# Patient Record
Sex: MALE | Race: WHITE | NOT HISPANIC OR LATINO | Employment: OTHER | URBAN - NONMETROPOLITAN AREA
[De-identification: names, ages, dates, MRNs, and addresses within clinical notes are randomized per-mention and may not be internally consistent; named-entity substitution may affect disease eponyms.]

---

## 2018-01-20 ENCOUNTER — OFFICE VISIT (OUTPATIENT)
Dept: URGENT CARE | Facility: PHYSICIAN GROUP | Age: 73
End: 2018-01-20

## 2018-01-20 VITALS
HEIGHT: 70 IN | SYSTOLIC BLOOD PRESSURE: 138 MMHG | HEART RATE: 65 BPM | TEMPERATURE: 97.7 F | DIASTOLIC BLOOD PRESSURE: 80 MMHG | RESPIRATION RATE: 18 BRPM | OXYGEN SATURATION: 98 % | BODY MASS INDEX: 37.61 KG/M2 | WEIGHT: 262.7 LBS

## 2018-01-20 DIAGNOSIS — Z02.4 ENCOUNTER FOR DEPARTMENT OF TRANSPORTATION (DOT) EXAMINATION FOR DRIVING LICENSE RENEWAL: ICD-10-CM

## 2018-01-20 DIAGNOSIS — E66.9 OBESITY (BMI 30-39.9): ICD-10-CM

## 2018-01-20 PROCEDURE — 7100 PR DOT PHYSICAL: Performed by: PHYSICIAN ASSISTANT

## 2018-01-20 RX ORDER — METOPROLOL TARTRATE 100 MG/1
100 TABLET ORAL 2 TIMES DAILY
COMMUNITY
End: 2018-11-27

## 2018-01-20 NOTE — PROGRESS NOTES
72 y.o. male comes in for a DOT physical.. He has a history of hypertension, controlled on medications No history of asthma, heart disease, murmurs, seizure disorder or syncopal episodes with activity.  Please see the chart for further information, however normal physical exam, cleared for 1 yr without restrictions.

## 2018-06-27 ENCOUNTER — OFFICE VISIT (OUTPATIENT)
Dept: URGENT CARE | Facility: PHYSICIAN GROUP | Age: 73
End: 2018-06-27
Payer: MEDICARE

## 2018-06-27 VITALS
TEMPERATURE: 98 F | DIASTOLIC BLOOD PRESSURE: 98 MMHG | RESPIRATION RATE: 16 BRPM | SYSTOLIC BLOOD PRESSURE: 144 MMHG | BODY MASS INDEX: 39.08 KG/M2 | WEIGHT: 273 LBS | OXYGEN SATURATION: 98 % | HEIGHT: 70 IN | HEART RATE: 60 BPM

## 2018-06-27 DIAGNOSIS — E66.9 OBESITY (BMI 35.0-39.9 WITHOUT COMORBIDITY): ICD-10-CM

## 2018-06-27 DIAGNOSIS — H00.012 HORDEOLUM EXTERNUM OF RIGHT LOWER EYELID: ICD-10-CM

## 2018-06-27 DIAGNOSIS — L73.9 FOLLICULITIS: ICD-10-CM

## 2018-06-27 PROCEDURE — 99214 OFFICE O/P EST MOD 30 MIN: CPT | Performed by: PHYSICIAN ASSISTANT

## 2018-06-27 RX ORDER — CEPHALEXIN 500 MG/1
500 CAPSULE ORAL 3 TIMES DAILY
Qty: 30 CAP | Refills: 0 | Status: SHIPPED | OUTPATIENT
Start: 2018-06-27 | End: 2018-07-07

## 2018-06-27 RX ORDER — ERYTHROMYCIN 5 MG/G
1 OINTMENT OPHTHALMIC 2 TIMES DAILY
Qty: 1 TUBE | Refills: 0 | Status: SHIPPED | OUTPATIENT
Start: 2018-06-27 | End: 2019-01-17

## 2018-06-27 NOTE — PROGRESS NOTES
Chief Complaint   Patient presents with   • Rash     under R arm   • Eye Drainage     R eye       HISTORY OF PRESENT ILLNESS: Patient is a 72 y.o. male who presents today because he has 2 separate issues.    1.  He has swelling and pain of his right lower lid.  This started about 4 or 5 days ago after he was scratched in the lower lid by some sagebrush.  It seemed to get better but then over the last several days has gotten worse.  He did have some drainage this morning.  No visual disturbances or changes.  He has not been taking any medications for his symptoms.    2.  In his right axillary region he has multiple erythematous papules that have been there and progressively worsening but waxing and waning for the last month or so.    Patient Active Problem List    Diagnosis Date Noted   • Obesity (BMI 35.0-39.9 without comorbidity) ([facility]) 06/27/2018   • Obesity (BMI 30-39.9) 01/20/2018       Allergies:Patient has no known allergies.    Current Outpatient Prescriptions Ordered in [facility]   Medication Sig Dispense Refill   • cephALEXin (KEFLEX) 500 MG Cap Take 1 Cap by mouth 3 times a day for 10 days. 30 Cap 0   • erythromycin 5 MG/GM Ointment Place 1 cm in right eye 2 times a day. 1 Tube 0   • metoprolol (LOPRESSOR) 100 MG Tab Take 100 mg by mouth 2 times a day.     • AMLODIPINE BESYLATE PO Take  by mouth.     • fluticasone (FLONASE) 50 MCG/ACT nasal spray Spray 2 Sprays in nose every day. 1 Bottle 1   • predniSONE (DELTASONE) 10 MG Tab 2 tabs BID x 2 days, 3 tabs daily x 2 days, 2 tabs daily x 2 days, 1 tab daily x 2 days 20 Tab 0     No current Epic-ordered facility-administered medications on file.        Past Medical History:   Diagnosis Date   • CATARACT    • Dental disorder     full dentures   • Heart burn        Social History   Substance Use Topics   • Smoking status: Former Smoker   • Smokeless tobacco: Never Used      Comment: 1993   • Alcohol use No       No family status information on file.   No family  "history on file.    ROS:  Review of Systems   Constitutional: Negative for fever, chills, weight loss and malaise/fatigue.   HENT: Negative for ear pain, nosebleeds, congestion, sore throat and neck pain.    Eyes: Negative for blurred vision.  Positive for eye complaint as in HPI  Respiratory: Negative for cough, sputum production, shortness of breath and wheezing.    Cardiovascular: Negative for chest pain, palpitations, orthopnea and leg swelling.   Gastrointestinal: Negative for heartburn, nausea, vomiting and abdominal pain.   Genitourinary: Negative for dysuria, urgency and frequency.     Exam:  Blood pressure 144/98, pulse 60, temperature 36.7 °C (98 °F), resp. rate 16, height 1.778 m (5' 10\"), weight 123.8 kg (273 lb), SpO2 98 %.  General:  Well nourished, well developed male in NAD  Head:Normocephalic, atraumatic  Eyes: PERRLA, EOM within normal limits, no conjunctival injection, no scleral icterus, visual fields and acuity grossly intact.  Right lower lid has tender swelling along the lateral aspect.  Nose: Symmetrical without tenderness, no discharge.  Mouth: reasonable hygiene, no erythema exudates or tonsillar enlargement.  Neck: no masses, range of motion within normal limits, no tracheal deviation. No obvious thyroid enlargement.  Extremities: no clubbing, cyanosis, or edema.  Skin: In his right axillary region he has multiple erythematous pustules    Please note that this dictation was created using voice recognition software. I have made every reasonable attempt to correct obvious errors, but I expect that there are errors of grammar and possibly content that I did not discover before finalizing the note.    Assessment/Plan:  1. Hordeolum externum of right lower eyelid  erythromycin 5 MG/GM Ointment   2. Folliculitis  cephALEXin (KEFLEX) 500 MG Cap   3. Obesity (BMI 35.0-39.9 without comorbidity)  Patient identified as having weight management issue.  Appropriate orders and counseling given. "   Recommended warm packs to the eye    Followup with primary care in the next 7-10 days if not significantly improving, return to the urgent care or go to the emergency room sooner for any worsening of symptoms.

## 2018-07-07 ENCOUNTER — APPOINTMENT (OUTPATIENT)
Dept: RADIOLOGY | Facility: IMAGING CENTER | Age: 73
End: 2018-07-07
Attending: FAMILY MEDICINE
Payer: MEDICARE

## 2018-07-07 ENCOUNTER — APPOINTMENT (OUTPATIENT)
Dept: URGENT CARE | Facility: PHYSICIAN GROUP | Age: 73
End: 2018-07-07
Payer: MEDICARE

## 2018-07-07 DIAGNOSIS — R06.02 SHORTNESS OF BREATH: ICD-10-CM

## 2018-07-07 PROCEDURE — 71046 X-RAY EXAM CHEST 2 VIEWS: CPT | Mod: TC,FY | Performed by: FAMILY MEDICINE

## 2018-10-10 ENCOUNTER — OFFICE VISIT (OUTPATIENT)
Dept: URGENT CARE | Facility: PHYSICIAN GROUP | Age: 73
End: 2018-10-10
Payer: MEDICARE

## 2018-10-10 VITALS
HEIGHT: 70 IN | HEART RATE: 70 BPM | WEIGHT: 274 LBS | TEMPERATURE: 97.8 F | DIASTOLIC BLOOD PRESSURE: 98 MMHG | OXYGEN SATURATION: 94 % | RESPIRATION RATE: 16 BRPM | BODY MASS INDEX: 39.22 KG/M2 | SYSTOLIC BLOOD PRESSURE: 148 MMHG

## 2018-10-10 DIAGNOSIS — L91.8 INFLAMED SKIN TAG: ICD-10-CM

## 2018-10-10 PROCEDURE — 11200 RMVL SKIN TAGS UP TO&INC 15: CPT | Performed by: PHYSICIAN ASSISTANT

## 2018-10-10 NOTE — PROGRESS NOTES
Pleasant 73-year-old male is here because he has a skin tag in his right axillary region that has been catching on his shirt, is very tender and sore and has been giving him problems when he is trying to drive and shift his vehicle.  Discussed pros and cons of having the skin tag removed, discussed risks and benefits.  Patient agreed to have skin tag removal here in the office.  Area was thoroughly cleaned, anesthetized with 0.  5 cc 2% lidocaine without epinephrine.  Skin tag was removed in its entirety using a derma blade and cauterization for the capillary bleeding, wound care instructions given.  Patient tolerated well.  Less than 1 cc blood loss

## 2018-11-27 ENCOUNTER — OFFICE VISIT (OUTPATIENT)
Dept: MEDICAL GROUP | Facility: PHYSICIAN GROUP | Age: 73
End: 2018-11-27
Payer: MEDICARE

## 2018-11-27 VITALS
TEMPERATURE: 98.6 F | OXYGEN SATURATION: 94 % | WEIGHT: 276.4 LBS | HEIGHT: 69 IN | BODY MASS INDEX: 40.94 KG/M2 | RESPIRATION RATE: 16 BRPM | SYSTOLIC BLOOD PRESSURE: 176 MMHG | HEART RATE: 86 BPM | DIASTOLIC BLOOD PRESSURE: 90 MMHG

## 2018-11-27 DIAGNOSIS — Z23 NEED FOR VACCINATION: ICD-10-CM

## 2018-11-27 DIAGNOSIS — R79.89 LOW TESTOSTERONE IN MALE: ICD-10-CM

## 2018-11-27 DIAGNOSIS — G47.30 SLEEP-DISORDERED BREATHING: ICD-10-CM

## 2018-11-27 DIAGNOSIS — I10 ESSENTIAL HYPERTENSION: ICD-10-CM

## 2018-11-27 DIAGNOSIS — J44.9 COPD SUGGESTED BY INITIAL EVALUATION (HCC): ICD-10-CM

## 2018-11-27 PROBLEM — G47.33 OSA (OBSTRUCTIVE SLEEP APNEA): Status: ACTIVE | Noted: 2018-11-27

## 2018-11-27 PROBLEM — E66.9 OBESITY (BMI 30-39.9): Status: RESOLVED | Noted: 2018-01-20 | Resolved: 2018-11-27

## 2018-11-27 PROCEDURE — 90662 IIV NO PRSV INCREASED AG IM: CPT | Performed by: NURSE PRACTITIONER

## 2018-11-27 PROCEDURE — 99214 OFFICE O/P EST MOD 30 MIN: CPT | Mod: 25 | Performed by: NURSE PRACTITIONER

## 2018-11-27 PROCEDURE — G0008 ADMIN INFLUENZA VIRUS VAC: HCPCS | Performed by: NURSE PRACTITIONER

## 2018-11-27 RX ORDER — LISINOPRIL AND HYDROCHLOROTHIAZIDE 12.5; 1 MG/1; MG/1
1 TABLET ORAL DAILY
Qty: 90 TAB | Refills: 1 | Status: SHIPPED | OUTPATIENT
Start: 2018-11-27 | End: 2018-12-20

## 2018-11-27 RX ORDER — METOPROLOL SUCCINATE 100 MG/1
200 TABLET, EXTENDED RELEASE ORAL DAILY
Qty: 60 TAB | Refills: 5
Start: 2018-11-27 | End: 2019-05-31 | Stop reason: SDUPTHER

## 2018-11-27 RX ORDER — ALBUTEROL SULFATE 90 UG/1
2 AEROSOL, METERED RESPIRATORY (INHALATION) EVERY 4 HOURS PRN
Qty: 1 INHALER | Refills: 1 | Status: SHIPPED | OUTPATIENT
Start: 2018-11-27 | End: 2019-11-12 | Stop reason: SDUPTHER

## 2018-11-27 NOTE — PATIENT INSTRUCTIONS
To do:     Stop the singulair (motelukast), stop the amlodipine.     Continue the metoprolol. Call me when you need a refill. Give the coupons to pharmacist at time of purchase. Will need to get coupons online at [employer] (in the future).    Start lisinopril HCTZ every morning. In addition to the metoprolol.   Continue to check BP daily, same time of day.     Fasting labs when you return, must be before 9 am.     Use the albuterol 2 puffs as needed for shortness of breath, feeling winded. Can use every 4-6 hours if needed.     Follow up 1 month

## 2018-11-27 NOTE — ASSESSMENT & PLAN NOTE
Patient is a 73-year-old male who is new to me and here to establish care.  He is currently being treated for hypertension with amlodipine 5 mg daily, metoprolol  mg twice daily.  Blood pressure remains uncontrolled, his blood pressure in the office today is 176/90, having some lightheadedness, no chest pain, shortness of breath, change in vision, headaches.  He feels that his previous PCP is not adequately controlling his blood pressure so is here for a second opinion.  He has not tried an ACE inhibitor or diuretic, we did discuss these today and he is willing to try a new medication.  He does not have prescription coverage and is concerned about finances.  He also wonders if one of his medications is causing flushing.

## 2018-11-27 NOTE — ASSESSMENT & PLAN NOTE
Patient reports low testosterone for which he was on IM testosterone replacement with previous PCP.  Unfortunately, [facility] will no longer fill previous PCPs controlled medications.  He was his symptoms were previously well controlled on testosterone cypionate 100 mg Q 14 day replacement. He has been off for a few weeks, having fatigue, leg cramps. Will get baseline labs.

## 2018-11-27 NOTE — ASSESSMENT & PLAN NOTE
Patient is a 73-year-old male with COPD diagnosed by outside provider following chest x-ray for shortness of breath.  Chest x-ray does show COPD with hyperinflation.  He is no longer smoking, quit 20 years ago though at the time he was smoking 3 packs a day.  He has no night sweats, unintentional weight loss.  He occasionally gets winded when putting on his socks, he is morbidly obese.

## 2018-11-27 NOTE — PROGRESS NOTES
[facility]  Primary Care Office Visit - Problem-Oriented        History:     Estuardo Christian is a 73 y.o. male who is here today to discuss Other (b/p problems high) and Establish Care (new pt / light headed)      Essential hypertension  Patient is a 73-year-old male who is new to me and here to establish care.  He is currently being treated for hypertension with amlodipine 5 mg daily, metoprolol  mg twice daily.  Blood pressure remains uncontrolled, his blood pressure in the office today is 176/90, having some lightheadedness, no chest pain, shortness of breath, change in vision, headaches.  He feels that his previous PCP is not adequately controlling his blood pressure so is here for a second opinion.  He has not tried an ACE inhibitor or diuretic, we did discuss these today and he is willing to try a new medication.  He does not have prescription coverage and is concerned about finances.  He also wonders if one of his medications is causing flushing.     COPD suggested by initial evaluation ([facility])  Patient is a 73-year-old male with COPD diagnosed by outside provider following chest x-ray for shortness of breath.  Chest x-ray does show COPD with hyperinflation.  He is no longer smoking, quit 20 years ago though at the time he was smoking 3 packs a day.  He has no night sweats, unintentional weight loss.  He occasionally gets winded when putting on his socks, he is morbidly obese.     Sleep-disordered breathing  Patient was diagnosed with obstructive sleep apnea by previous PCP, has not had sleep study, CPAP was recommended though patient refuses.  We did discuss the long-term consequences of uncontrolled obstructive sleep apnea, specifically as it relates to hypertension and heart failure.     Low testosterone in male  Patient reports low testosterone for which he was on IM testosterone replacement with previous PCP.  Unfortunately, [facility] will no longer fill previous PCPs controlled medications.   He was his symptoms were previously well controlled on testosterone cypionate 100 mg Q 14 day replacement. He has been off for a few weeks, having fatigue, leg cramps. Will get baseline labs.         Past Medical History:   Diagnosis Date   • CATARACT    • Dental disorder     full dentures   • Heart burn      Past Surgical History:   Procedure Laterality Date   • NASAL FLAP  4/14/2011    Performed by BUTCH MCLEAN at SURGERY SAME DAY [facility] ORS   • LUMBAR LAMINECTOMY DISKECTOMY  3/25/2009    Performed by RANULFO ASH at SURGERY [facility] ORS   • SHOULDER SURGERY      right rcr     Social History     Social History   • Marital status:      Spouse name: N/A   • Number of children: N/A   • Years of education: N/A     Occupational History   • Not on file.     Social History Main Topics   • Smoking status: Former Smoker   • Smokeless tobacco: Never Used      Comment: 1993   • Alcohol use No   • Drug use: No   • Sexual activity: Not on file     Other Topics Concern   • Not on file     Social History Narrative   • No narrative on file     History   Smoking Status   • Former Smoker   Smokeless Tobacco   • Never Used     Comment: 1993     No family history on file.  No Known Allergies    Problem List:     Patient Active Problem List    Diagnosis Date Noted   • Essential hypertension 11/27/2018   • COPD suggested by initial evaluation ([facility]) 11/27/2018   • Sleep-disordered breathing 11/27/2018   • Low testosterone in male 11/27/2018   • Obesity (BMI 35.0-39.9 without comorbidity) ([facility]) 06/27/2018         Medications:     Current Outpatient Prescriptions:   •  metoprolol SR (TOPROL XL) 100 MG TABLET SR 24 HR, Take 2 Tabs by mouth every day., Disp: 60 Tab, Rfl: 5  •  lisinopril-hydrochlorothiazide (PRINZIDE, ZESTORETIC) 10-12.5 MG per tablet, Take 1 Tab by mouth every day., Disp: 90 Tab, Rfl: 1  •  albuterol 108 (90 Base) MCG/ACT Aero Soln inhalation aerosol, Inhale 2 Puffs by mouth every four hours as needed  "for Shortness of Breath., Disp: 1 Inhaler, Rfl: 1  •  erythromycin 5 MG/GM Ointment, Place 1 cm in right eye 2 times a day. (Patient not taking: Reported on 11/27/2018), Disp: 1 Tube, Rfl: 0  •  fluticasone (FLONASE) 50 MCG/ACT nasal spray, Spray 2 Sprays in nose every day. (Patient not taking: Reported on 11/27/2018), Disp: 1 Bottle, Rfl: 1  •  predniSONE (DELTASONE) 10 MG Tab, 2 tabs BID x 2 days, 3 tabs daily x 2 days, 2 tabs daily x 2 days, 1 tab daily x 2 days, Disp: 20 Tab, Rfl: 0      Review of Systems:     Pertinent positives as per HPI, all other systems reviewed and WNL     Physical Assessment:     VS: BP (!) 176/90 (BP Location: Left arm, Patient Position: Sitting, BP Cuff Size: Adult long)   Pulse 86   Temp 37 °C (98.6 °F) (Temporal)   Resp 16   Ht 1.753 m (5' 9\")   Wt (!) 125.4 kg (276 lb 6.4 oz)   SpO2 94%   BMI 40.82 kg/m²     General: Well-developed, well-nourished, male, obese     Head: PERRL, EOMI. Normocephalic. No facial asymmetry noted. Flushed face.  Cardiovasc:  RRR, no MRG. No thrills or bruits. Pulses 2+ and symmetric at all distal extremities.  Pulmonary: Lungs clear bilaterally.  Normal respiratory effort. No wheeze or crackles.   Neuro: Alert and oriented, CNs II-XII intact, no focal deficits.  Skin:No rashes noted. Skin warm, dry, intact    Psych: Dressed appropriately for the weather, pleasant and conversant.  Affect, mood & judgment appropriate.    Assessment/Plan:   Estuardo was seen today for other and establish care.    Diagnoses and all orders for this visit:    Essential hypertension, chronic, uncontrolled   - discontinue amlodipine - likely contributing to flushing, continue metoprolol ER, start lisinopril hydrochlorothiazide daily.  Aware of adverse effects of medication.  Monitor blood pressure at home.  Follow-up in 2-4 weeks for office visit, sooner if needed.  Fasting labs ordered.  -     lisinopril-hydrochlorothiazide (PRINZIDE, ZESTORETIC) 10-12.5 MG per tablet; Take 1 " Tab by mouth every day.  -     COMP METABOLIC PANEL; Future  -     metoprolol SR (TOPROL XL) 100 MG TABLET SR 24 HR; Take 2 Tabs by mouth every day.    Low testosterone in male, unclear control, check labs  -     TESTOSTERONE, FREE AND TOTAL; Future    COPD suggested by initial evaluation ([facility])  -Remote though significant smoking history and a chest x-ray with findings consistent with COPD and hyperinflation of the lungs.  Trial albuterol.  Follow-up in 1 month, sooner if needed.  Consideration for LABA/ICS.   -     albuterol 108 (90 Base) MCG/ACT Aero Soln inhalation aerosol; Inhale 2 Puffs by mouth every four hours as needed for Shortness of Breath.    Need for vaccination  -     INFLUENZA VACCINE, HIGH DOSE (65+ ONLY)    Sleep-disordered breathing  - will likely need formal sleep study, patient declines referral at this time.       Patient is agreeable to the above plan and voiced understanding. All questions answered.     Please note that this dictation was created using voice recognition software. I have made every reasonable attempt to correct obvious errors, but I expect that there are errors of grammar and possibly content that I did not discover before finalizing the note.      VALENTIN Santana  11/27/2018, 2:10 PM

## 2018-11-27 NOTE — ASSESSMENT & PLAN NOTE
Patient was diagnosed with obstructive sleep apnea by previous PCP, has not had sleep study, CPAP was recommended though patient refuses.  We did discuss the long-term consequences of uncontrolled obstructive sleep apnea, specifically as it relates to hypertension and heart failure.

## 2018-12-17 ENCOUNTER — HOSPITAL ENCOUNTER (OUTPATIENT)
Dept: LAB | Facility: MEDICAL CENTER | Age: 73
End: 2018-12-17
Attending: NURSE PRACTITIONER
Payer: MEDICARE

## 2018-12-17 DIAGNOSIS — I10 ESSENTIAL HYPERTENSION: ICD-10-CM

## 2018-12-17 DIAGNOSIS — R79.89 LOW TESTOSTERONE IN MALE: ICD-10-CM

## 2018-12-17 LAB
ALBUMIN SERPL BCP-MCNC: 4.5 G/DL (ref 3.2–4.9)
ALBUMIN/GLOB SERPL: 1.8 G/DL
ALP SERPL-CCNC: 74 U/L (ref 30–99)
ALT SERPL-CCNC: 59 U/L (ref 2–50)
ANION GAP SERPL CALC-SCNC: 8 MMOL/L (ref 0–11.9)
AST SERPL-CCNC: 37 U/L (ref 12–45)
BILIRUB SERPL-MCNC: 1.7 MG/DL (ref 0.1–1.5)
BUN SERPL-MCNC: 17 MG/DL (ref 8–22)
CALCIUM SERPL-MCNC: 9.8 MG/DL (ref 8.5–10.5)
CHLORIDE SERPL-SCNC: 102 MMOL/L (ref 96–112)
CO2 SERPL-SCNC: 29 MMOL/L (ref 20–33)
CREAT SERPL-MCNC: 1.02 MG/DL (ref 0.5–1.4)
GLOBULIN SER CALC-MCNC: 2.5 G/DL (ref 1.9–3.5)
GLUCOSE SERPL-MCNC: 151 MG/DL (ref 65–99)
POTASSIUM SERPL-SCNC: 4.3 MMOL/L (ref 3.6–5.5)
PROT SERPL-MCNC: 7 G/DL (ref 6–8.2)
SODIUM SERPL-SCNC: 139 MMOL/L (ref 135–145)

## 2018-12-17 PROCEDURE — 36415 COLL VENOUS BLD VENIPUNCTURE: CPT

## 2018-12-17 PROCEDURE — 80053 COMPREHEN METABOLIC PANEL: CPT

## 2018-12-17 PROCEDURE — 84270 ASSAY OF SEX HORMONE GLOBUL: CPT

## 2018-12-17 PROCEDURE — 84403 ASSAY OF TOTAL TESTOSTERONE: CPT

## 2018-12-19 LAB
SHBG SERPL-SCNC: 47 NMOL/L (ref 11–80)
TESTOST FREE MFR SERPL: 1.4 % (ref 1.6–2.9)
TESTOST FREE SERPL-MCNC: 38 PG/ML (ref 47–244)
TESTOST SERPL-MCNC: 265 NG/DL (ref 300–720)

## 2018-12-20 ENCOUNTER — NON-PROVIDER VISIT (OUTPATIENT)
Dept: MEDICAL GROUP | Facility: PHYSICIAN GROUP | Age: 73
End: 2018-12-20
Payer: MEDICARE

## 2018-12-20 ENCOUNTER — TELEPHONE (OUTPATIENT)
Dept: MEDICAL GROUP | Facility: PHYSICIAN GROUP | Age: 73
End: 2018-12-20

## 2018-12-20 ENCOUNTER — OFFICE VISIT (OUTPATIENT)
Dept: MEDICAL GROUP | Facility: PHYSICIAN GROUP | Age: 73
End: 2018-12-20
Payer: MEDICARE

## 2018-12-20 VITALS
OXYGEN SATURATION: 95 % | DIASTOLIC BLOOD PRESSURE: 88 MMHG | TEMPERATURE: 97.5 F | RESPIRATION RATE: 16 BRPM | SYSTOLIC BLOOD PRESSURE: 160 MMHG | BODY MASS INDEX: 40.58 KG/M2 | WEIGHT: 274 LBS | HEIGHT: 69 IN | HEART RATE: 64 BPM

## 2018-12-20 DIAGNOSIS — H92.01 RIGHT EAR PAIN: ICD-10-CM

## 2018-12-20 DIAGNOSIS — R79.89 LOW TESTOSTERONE IN MALE: ICD-10-CM

## 2018-12-20 DIAGNOSIS — I10 ESSENTIAL HYPERTENSION: ICD-10-CM

## 2018-12-20 DIAGNOSIS — R73.01 ELEVATED FASTING GLUCOSE: ICD-10-CM

## 2018-12-20 DIAGNOSIS — Z13.220 SCREENING FOR LIPID DISORDERS: ICD-10-CM

## 2018-12-20 PROCEDURE — 99214 OFFICE O/P EST MOD 30 MIN: CPT | Performed by: NURSE PRACTITIONER

## 2018-12-20 PROCEDURE — 96372 THER/PROPH/DIAG INJ SC/IM: CPT | Performed by: NURSE PRACTITIONER

## 2018-12-20 RX ORDER — TESTOSTERONE CYPIONATE 200 MG/ML
100 INJECTION, SOLUTION INTRAMUSCULAR ONCE
Qty: 0.5 ML | Refills: 0 | Status: SHIPPED | OUTPATIENT
Start: 2018-12-20 | End: 2018-12-20

## 2018-12-20 RX ORDER — TESTOSTERONE CYPIONATE 200 MG/ML
100 INJECTION, SOLUTION INTRAMUSCULAR ONCE
Status: COMPLETED | OUTPATIENT
Start: 2018-12-20 | End: 2018-12-20

## 2018-12-20 RX ORDER — LISINOPRIL AND HYDROCHLOROTHIAZIDE 20; 12.5 MG/1; MG/1
2 TABLET ORAL DAILY
Qty: 180 TAB | Refills: 1 | Status: SHIPPED | OUTPATIENT
Start: 2018-12-20 | End: 2019-05-31 | Stop reason: SDUPTHER

## 2018-12-20 RX ORDER — TESTOSTERONE CYPIONATE 200 MG/ML
100 INJECTION, SOLUTION INTRAMUSCULAR
Qty: 1 VIAL | Refills: 2 | Status: SHIPPED | OUTPATIENT
Start: 2018-12-20 | End: 2019-01-03

## 2018-12-20 RX ADMIN — TESTOSTERONE CYPIONATE 100 MG: 200 INJECTION, SOLUTION INTRAMUSCULAR at 16:22

## 2018-12-20 NOTE — NON-PROVIDER
Estuardo Christian is a 73 y.o. male here for a non-provider visit for Testosterone injection.    Reason for injection: Low testosterone  Order in MAR?: Yes  Patient supplied?:Yes  Minimum interval has been met for this injection (per MAR order): Yes    Order and dose verified by: Antoinette GÓMEZ  Patient tolerated injection and no adverse effects were observed or reported: Yes    # of Administrations remaining in MAR:

## 2018-12-20 NOTE — ASSESSMENT & PLAN NOTE
Patient is a 73-year-old male who is here for follow-up.  He continues on lisinopril hydrochlorothiazide 10-12 0.5 daily.  Unfortunately blood pressure is uncontrolled.  No longer on the amlodipine and the flushing has significantly improved.  He denies chest pain, shortness of breath, change in vision, headaches.  CMP is reviewed, normal renal function.

## 2018-12-20 NOTE — ASSESSMENT & PLAN NOTE
She was noted to have elevated fasting glucose on most recent labs @151.  He denies polyuria or polydipsia though he does describe polyphasia.  He is obese, strong family history of type 2 diabetes.  Does not want to change his diet and certainly does not want to go on insulin.

## 2018-12-20 NOTE — ASSESSMENT & PLAN NOTE
He describes right ear fullness for the last couple of days, no rosa ear pain or hearing loss, ringing in the ear.  Does have a cold, mild cough.

## 2018-12-20 NOTE — PROGRESS NOTES
[facility]  Primary Care Office Visit - Problem-Oriented        History:     Estuardo Christian is a 73 y.o. male who is here today to discuss Hypertension and COPD      Essential hypertension  Patient is a 73-year-old male who is here for follow-up.  He continues on lisinopril hydrochlorothiazide 10-12 0.5 daily.  Unfortunately blood pressure is uncontrolled.  No longer on the amlodipine and the flushing has significantly improved.  He denies chest pain, shortness of breath, change in vision, headaches.  CMP is reviewed, normal renal function.    Low testosterone in male  She was noted to have low free and total testosterone on most recent labs, describes debilitating fatigue.  Would like to resume testosterone cypionate injection.    Right ear pain  He describes right ear fullness for the last couple of days, no rosa ear pain or hearing loss, ringing in the ear.  Does have a cold, mild cough.    Elevated fasting glucose  She was noted to have elevated fasting glucose on most recent labs @151.  He denies polyuria or polydipsia though he does describe polyphasia.  He is obese, strong family history of type 2 diabetes.  Does not want to change his diet and certainly does not want to go on insulin.        Past Medical History:   Diagnosis Date   • CATARACT    • Dental disorder     full dentures   • Heart burn      Past Surgical History:   Procedure Laterality Date   • NASAL FLAP  4/14/2011    Performed by BUTCH MCLEAN at SURGERY SAME DAY [facility] ORS   • LUMBAR LAMINECTOMY DISKECTOMY  3/25/2009    Performed by RANULFO ASH at SURGERY [facility] ORS   • SHOULDER SURGERY      right rcr     Social History     Social History   • Marital status:      Spouse name: N/A   • Number of children: N/A   • Years of education: N/A     Occupational History   • Not on file.     Social History Main Topics   • Smoking status: Former Smoker   • Smokeless tobacco: Never Used      Comment: 1993   • Alcohol use No    • Drug use: No   • Sexual activity: Not on file     Other Topics Concern   • Not on file     Social History Narrative   • No narrative on file     History   Smoking Status   • Former Smoker   Smokeless Tobacco   • Never Used     Comment: 1993     History reviewed. No pertinent family history.  No Known Allergies    Problem List:     Patient Active Problem List    Diagnosis Date Noted   • Right ear pain 12/20/2018   • Elevated fasting glucose 12/20/2018   • Essential hypertension 11/27/2018   • COPD suggested by initial evaluation ([facility]) 11/27/2018   • Sleep-disordered breathing 11/27/2018   • Low testosterone in male 11/27/2018   • Obesity (BMI 35.0-39.9 without comorbidity) ([facility]) 06/27/2018         Medications:     Current Outpatient Prescriptions:   •  testosterone cypionate (DEPO-TESTOSTERONE) 200 MG/ML Solution injection, 0.5 mL by Intramuscular route every 14 days for 90 days., Disp: 1 Vial, Rfl: 2  •  lisinopril-hydrochlorothiazide (PRINZIDE, ZESTORETIC) 20-12.5 MG per tablet, Take 2 Tabs by mouth every day., Disp: 180 Tab, Rfl: 1  •  metoprolol SR (TOPROL XL) 100 MG TABLET SR 24 HR, Take 2 Tabs by mouth every day., Disp: 60 Tab, Rfl: 5  •  albuterol 108 (90 Base) MCG/ACT Aero Soln inhalation aerosol, Inhale 2 Puffs by mouth every four hours as needed for Shortness of Breath., Disp: 1 Inhaler, Rfl: 1  •  erythromycin 5 MG/GM Ointment, Place 1 cm in right eye 2 times a day. (Patient not taking: Reported on 11/27/2018), Disp: 1 Tube, Rfl: 0  •  fluticasone (FLONASE) 50 MCG/ACT nasal spray, Spray 2 Sprays in nose every day. (Patient not taking: Reported on 11/27/2018), Disp: 1 Bottle, Rfl: 1  •  predniSONE (DELTASONE) 10 MG Tab, 2 tabs BID x 2 days, 3 tabs daily x 2 days, 2 tabs daily x 2 days, 1 tab daily x 2 days, Disp: 20 Tab, Rfl: 0      Review of Systems:     Pertinent positives as per HPI, all other systems reviewed and WNL     Physical Assessment:     VS: BP (!) 170/90 (BP Location: Right arm, Patient  "Position: Sitting, BP Cuff Size: Large adult)   Pulse 64   Temp 36.4 °C (97.5 °F) (Temporal)   Resp 16   Ht 1.753 m (5' 9\")   Wt 124.3 kg (274 lb)   SpO2 95%   BMI 40.46 kg/m²     General: Well-developed, well-nourished, male, obese    Head: PERRL, EOMI. Normocephalic. No facial asymmetry noted.  Ears: scant fluid noted behind right TM.  Bilateral EACs wnl.  Cardiovasc: RRR, no MRG. No thrills or bruits. Pulses 2+ and symmetric at all distal extremities.  Pulmonary: Lungs clear bilaterally.  Normal respiratory effort. No wheeze or crackles.   Extremities: No edema  Neuro: Alert and oriented, CNs II-XII intact, no focal deficits.  Skin:No rashes noted. Skin warm, dry, intact     Psych: Dressed appropriately for the weather, pleasant and conversant.  Affect, mood & judgment appropriate.    Assessment/Plan:   Estuardo was seen today for hypertension and copd.    Diagnoses and all orders for this visit:    Essential hypertension, uncontrolled   -Maximize lisinopril, no changes to metoprolol, monitor blood pressure at home, follow-up in 7-10 days for blood pressure check in the office.  Follow-up in 1 month, sooner if needed.  -     lisinopril-hydrochlorothiazide (PRINZIDE, ZESTORETIC) 20-12.5 MG per tablet; Take 2 Tabs by mouth every day.    Right ear pain  -Over-the-counter symptomatic management recommended, monitor    Low testosterone in male, uncontrolled  -We will resume testosterone injection Q 14 days and recheck labs at 7 days. Aware of risks of medication. Will need to obtain PSA, CBC in the future.   -     testosterone cypionate (DEPO-TESTOSTERONE) 200 MG/ML Solution injection; 0.5 mL by Intramuscular route every 14 days for 90 days.  -     TESTOSTERONE F&T MALE ADULT; Future    Elevated fasting glucose, new   -Suspect that patient has undiagnosed type 2 diabetes, discussed A1c and follow-up in 1 month.  Recommend weight loss, discussed dietary modifications.  -     HEMOGLOBIN A1C; Future    Screening for " lipid disorders  -     Lipid Profile; Future        Patient is agreeable to the above plan and voiced understanding. All questions answered.     Please note that this dictation was created using voice recognition software. I have made every reasonable attempt to correct obvious errors, but I expect that there are errors of grammar and possibly content that I did not discover before finalizing the note.      VALENTIN Santana  12/20/2018, 10:02 AM

## 2018-12-20 NOTE — ASSESSMENT & PLAN NOTE
She was noted to have low free and total testosterone on most recent labs, describes debilitating fatigue.  Would like to resume testosterone cypionate injection.

## 2018-12-20 NOTE — PATIENT INSTRUCTIONS
To do:     Please get fasting labs done and follow up in 3-4 weeks.     Increase your blood pressure medicine.     Start Testerone injection every 14 days. Labs 7 days after injection.

## 2018-12-21 NOTE — PROGRESS NOTES
Estuardo Christian is a 73 y.o. male here for a non-provider visit for Testosterone injection.    Reason for injection: Low testosterone  Order in MAR?: No  Patient supplied?:Yes  Minimum interval has been met for this injection (per MAR order): Yes    Order and dose verified by: Antoinette DALEY  Patient tolerated injection and no adverse effects were observed or reported: Yes    # of Administrations remaining in MAR: N/A

## 2018-12-27 ENCOUNTER — HOSPITAL ENCOUNTER (OUTPATIENT)
Dept: LAB | Facility: MEDICAL CENTER | Age: 73
End: 2018-12-27
Attending: NURSE PRACTITIONER
Payer: MEDICARE

## 2018-12-27 DIAGNOSIS — R79.89 LOW TESTOSTERONE IN MALE: ICD-10-CM

## 2018-12-27 DIAGNOSIS — Z13.220 SCREENING FOR LIPID DISORDERS: ICD-10-CM

## 2018-12-27 DIAGNOSIS — R73.01 ELEVATED FASTING GLUCOSE: ICD-10-CM

## 2018-12-27 LAB
CHOLEST SERPL-MCNC: 162 MG/DL (ref 100–199)
EST. AVERAGE GLUCOSE BLD GHB EST-MCNC: 180 MG/DL
FASTING STATUS PATIENT QL REPORTED: NORMAL
HBA1C MFR BLD: 7.9 % (ref 0–5.6)
HDLC SERPL-MCNC: 31 MG/DL
LDLC SERPL CALC-MCNC: 52 MG/DL
TRIGL SERPL-MCNC: 393 MG/DL (ref 0–149)

## 2018-12-27 PROCEDURE — 80061 LIPID PANEL: CPT

## 2018-12-27 PROCEDURE — 84403 ASSAY OF TOTAL TESTOSTERONE: CPT

## 2018-12-27 PROCEDURE — 83036 HEMOGLOBIN GLYCOSYLATED A1C: CPT

## 2018-12-27 PROCEDURE — 84270 ASSAY OF SEX HORMONE GLOBUL: CPT

## 2018-12-27 PROCEDURE — 36415 COLL VENOUS BLD VENIPUNCTURE: CPT

## 2018-12-29 LAB
SHBG SERPL-SCNC: 39 NMOL/L (ref 11–80)
TESTOST FREE MFR SERPL: 2.1 % (ref 1.6–2.9)
TESTOST FREE SERPL-MCNC: 261 PG/ML (ref 47–244)
TESTOST SERPL-MCNC: 1262 NG/DL (ref 300–720)

## 2019-01-03 ENCOUNTER — NON-PROVIDER VISIT (OUTPATIENT)
Dept: MEDICAL GROUP | Facility: PHYSICIAN GROUP | Age: 74
End: 2019-01-03
Payer: MEDICARE

## 2019-01-03 ENCOUNTER — TELEPHONE (OUTPATIENT)
Dept: MEDICAL GROUP | Facility: PHYSICIAN GROUP | Age: 74
End: 2019-01-03

## 2019-01-03 DIAGNOSIS — R79.89 LOW TESTOSTERONE IN MALE: ICD-10-CM

## 2019-01-03 PROCEDURE — 96372 THER/PROPH/DIAG INJ SC/IM: CPT | Performed by: NURSE PRACTITIONER

## 2019-01-03 PROCEDURE — 99999 PR NO CHARGE: CPT | Performed by: NURSE PRACTITIONER

## 2019-01-03 RX ORDER — TESTOSTERONE CYPIONATE 200 MG/ML
50 INJECTION, SOLUTION INTRAMUSCULAR ONCE
Qty: 10 ML | Refills: 0 | Status: SHIPPED
Start: 2019-01-03 | End: 2019-04-18 | Stop reason: SDUPTHER

## 2019-01-03 RX ORDER — TESTOSTERONE CYPIONATE 200 MG/ML
50 INJECTION, SOLUTION INTRAMUSCULAR ONCE
Status: COMPLETED | OUTPATIENT
Start: 2019-01-03 | End: 2019-01-03

## 2019-01-03 RX ADMIN — TESTOSTERONE CYPIONATE 50 MG: 200 INJECTION, SOLUTION INTRAMUSCULAR at 12:47

## 2019-01-03 NOTE — TELEPHONE ENCOUNTER
Pharmacy called stating that the new testosterone Rx states 0.25 ml to be give when previous Rx states 0.5 ml. Pharmacy would like to know if this is a dose change. Please advise.

## 2019-01-03 NOTE — NON-PROVIDER
Estuardo Christian is a 73 y.o. male here for a non-provider visit for testoserone injection.    Reason for injection: Low testoserone  Order in MAR?: Yes  Patient supplied?:Yes  Minimum interval has been met for this injection (per MAR order): Yes    Order and dose verified by: Antoinette Carroll  Patient tolerated injection and no adverse effects were observed or reported: Yes    # of Administrations remaining in MAR: NA

## 2019-01-17 ENCOUNTER — OFFICE VISIT (OUTPATIENT)
Dept: MEDICAL GROUP | Facility: PHYSICIAN GROUP | Age: 74
End: 2019-01-17
Payer: MEDICARE

## 2019-01-17 ENCOUNTER — OFFICE VISIT (OUTPATIENT)
Dept: URGENT CARE | Facility: PHYSICIAN GROUP | Age: 74
End: 2019-01-17
Payer: MEDICARE

## 2019-01-17 VITALS
TEMPERATURE: 98.1 F | RESPIRATION RATE: 18 BRPM | OXYGEN SATURATION: 92 % | BODY MASS INDEX: 40.14 KG/M2 | DIASTOLIC BLOOD PRESSURE: 70 MMHG | SYSTOLIC BLOOD PRESSURE: 132 MMHG | HEIGHT: 69 IN | HEART RATE: 63 BPM | WEIGHT: 271 LBS

## 2019-01-17 VITALS
WEIGHT: 271 LBS | SYSTOLIC BLOOD PRESSURE: 132 MMHG | OXYGEN SATURATION: 92 % | TEMPERATURE: 98.1 F | RESPIRATION RATE: 18 BRPM | DIASTOLIC BLOOD PRESSURE: 70 MMHG | HEIGHT: 69 IN | HEART RATE: 64 BPM | BODY MASS INDEX: 40.14 KG/M2

## 2019-01-17 DIAGNOSIS — E78.1 HYPERTRIGLYCERIDEMIA: ICD-10-CM

## 2019-01-17 DIAGNOSIS — E11.9 TYPE 2 DIABETES MELLITUS WITHOUT COMPLICATION, WITHOUT LONG-TERM CURRENT USE OF INSULIN (HCC): ICD-10-CM

## 2019-01-17 DIAGNOSIS — R79.89 LOW TESTOSTERONE IN MALE: ICD-10-CM

## 2019-01-17 DIAGNOSIS — Z02.4 ENCOUNTER FOR CDL (COMMERCIAL DRIVING LICENSE) EXAM: ICD-10-CM

## 2019-01-17 DIAGNOSIS — I10 ESSENTIAL HYPERTENSION: ICD-10-CM

## 2019-01-17 PROCEDURE — 96372 THER/PROPH/DIAG INJ SC/IM: CPT | Performed by: NURSE PRACTITIONER

## 2019-01-17 PROCEDURE — 7100 PR DOT PHYSICAL: Performed by: PHYSICIAN ASSISTANT

## 2019-01-17 PROCEDURE — 99213 OFFICE O/P EST LOW 20 MIN: CPT | Mod: 25 | Performed by: NURSE PRACTITIONER

## 2019-01-17 RX ORDER — TESTOSTERONE CYPIONATE 200 MG/ML
100 INJECTION, SOLUTION INTRAMUSCULAR ONCE
Status: COMPLETED | OUTPATIENT
Start: 2019-01-17 | End: 2019-01-17

## 2019-01-17 RX ADMIN — TESTOSTERONE CYPIONATE 100 MG: 200 INJECTION, SOLUTION INTRAMUSCULAR at 14:47

## 2019-01-17 ASSESSMENT — PATIENT HEALTH QUESTIONNAIRE - PHQ9: CLINICAL INTERPRETATION OF PHQ2 SCORE: 0

## 2019-01-17 NOTE — PROGRESS NOTES
Patient is here for a DOT physical.  He has a history of diabetes and hypertension.  His last A1c was 7.6 in December 2018.  He denies any hypoglycemic episodes ever.  Exam is normal.  Paperwork reviewed.  Cleared for 1 year.

## 2019-01-17 NOTE — PATIENT INSTRUCTIONS
Diabetes Mellitus and Food  It is important for you to manage your blood sugar (glucose) level. Your blood glucose level can be greatly affected by what you eat. Eating healthier foods in the appropriate amounts throughout the day at about the same time each day will help you control your blood glucose level. It can also help slow or prevent worsening of your diabetes mellitus. Healthy eating may even help you improve the level of your blood pressure and reach or maintain a healthy weight.  General recommendations for healthful eating and cooking habits include:  · Eating meals and snacks regularly. Avoid going long periods of time without eating to lose weight.  · Eating a diet that consists mainly of plant-based foods, such as fruits, vegetables, nuts, legumes, and whole grains.  · Using low-heat cooking methods, such as baking, instead of high-heat cooking methods, such as deep frying.  Work with your dietitian to make sure you understand how to use the Nutrition Facts information on food labels.  How can food affect me?  Carbohydrates   Carbohydrates affect your blood glucose level more than any other type of food. Your dietitian will help you determine how many carbohydrates to eat at each meal and teach you how to count carbohydrates. Counting carbohydrates is important to keep your blood glucose at a healthy level, especially if you are using insulin or taking certain medicines for diabetes mellitus.  Alcohol   Alcohol can cause sudden decreases in blood glucose (hypoglycemia), especially if you use insulin or take certain medicines for diabetes mellitus. Hypoglycemia can be a life-threatening condition. Symptoms of hypoglycemia (sleepiness, dizziness, and disorientation) are similar to symptoms of having too much alcohol.  If your health care provider has given you approval to drink alcohol, do so in moderation and use the following guidelines:  · Women should not have more than one drink per day, and men  should not have more than two drinks per day. One drink is equal to:  ¨ 12 oz of beer.  ¨ 5 oz of wine.  ¨ 1½ oz of hard liquor.  · Do not drink on an empty stomach.  · Keep yourself hydrated. Have water, diet soda, or unsweetened iced tea.  · Regular soda, juice, and other mixers might contain a lot of carbohydrates and should be counted.  What foods are not recommended?  As you make food choices, it is important to remember that all foods are not the same. Some foods have fewer nutrients per serving than other foods, even though they might have the same number of calories or carbohydrates. It is difficult to get your body what it needs when you eat foods with fewer nutrients. Examples of foods that you should avoid that are high in calories and carbohydrates but low in nutrients include:  · Trans fats (most processed foods list trans fats on the Nutrition Facts label).  · Regular soda.  · Juice.  · Candy.  · Sweets, such as cake, pie, doughnuts, and cookies.  · Fried foods.  What foods can I eat?  Eat nutrient-rich foods, which will nourish your body and keep you healthy. The food you should eat also will depend on several factors, including:  · The calories you need.  · The medicines you take.  · Your weight.  · Your blood glucose level.  · Your blood pressure level.  · Your cholesterol level.  You should eat a variety of foods, including:  · Protein.  ¨ Lean cuts of meat.  ¨ Proteins low in saturated fats, such as fish, egg whites, and beans. Avoid processed meats.  · Fruits and vegetables.  ¨ Fruits and vegetables that may help control blood glucose levels, such as apples, mangoes, and yams.  · Dairy products.  ¨ Choose fat-free or low-fat dairy products, such as milk, yogurt, and cheese.  · Grains, bread, pasta, and rice.  ¨ Choose whole grain products, such as multigrain bread, whole oats, and brown rice. These foods may help control blood pressure.  · Fats.  ¨ Foods containing healthful fats, such as nuts,  avocado, olive oil, canola oil, and fish.  Does everyone with diabetes mellitus have the same meal plan?  Because every person with diabetes mellitus is different, there is not one meal plan that works for everyone. It is very important that you meet with a dietitian who will help you create a meal plan that is just right for you.  This information is not intended to replace advice given to you by your health care provider. Make sure you discuss any questions you have with your health care provider.  Document Released: 09/14/2006 Document Revised: 05/25/2017 Document Reviewed: 11/14/2014  [email or web address] Interactive Patient Education © 2017 [email or web address]        Diabetes and Small Vessel Disease  Small vessel disease (microvascular disease) includes nephropathy, retinopathy, and neuropathy. People with diabetes are at risk for these problems, but keeping blood glucose (sugar) controlled is helpful in preventing problems.  DIABETIC KIDNEY PROBLEMS (DIABETIC NEPHROPATHY)  · Diabetic nephropathy occurs in many patients with diabetes.   · Damage to the small vessels in the kidneys is the leading cause of end-stage renal disease (ESRD).   · Protein in the urine (albuminuria) in the range of 30 to 300 mg/24 h (microalbuminuria) is a sign of the earliest stage of diabetic nephropathy.   · Good blood glucose (sugar) and blood pressure control significantly reduce the progression of nephropathy.   DIABETIC EYE PROBLEMS (DIABETIC RETINOPATHY)  · Diabetic retinopathy is the most common cause of new cases of blindness in adults. It is related to the number of years you have had diabetes.   · Common risk factors include high blood sugar (hyperglycemia), high blood pressure (hypertension), and poorly controlled blood lipids such as high blood cholesterol (hypercholesterolemia).   DIABETIC NERVE PROBLEMS (DIABETIC NEUROPATHY)  Diabetic neuropathy is the most common, long-term complication of diabetes. It is responsible for more than half  of leg amputations not due to accidents. The main risk for developing diabetic neuropathy seems to be uncontrolled blood sugars. Hyperglycemia damages the nerve fibers causing sensation (feeling) problems.  The closer you can keep the following guidelines, the better chance you will have avoiding problems from small vessel disease.  · Working toward near normal blood glucose or as normal as possible. You will need to keep your blood glucose and A1c at the target range prescribed by your caregiver.   · Keep your blood pressure less than 130/80.   · Keep your low-density lipoprotein (LDL) cholesterol (one of the fats in your blood) at less than 100 mg/dL. An LDL less than 70 mg/dL may be recommended for high risk patients.   You cannot change your family history, but it is important to change the risk factors that you can. Risk factors you can control include:  · Controlling high blood pressure.   · Stopping smoking.   · Using alcohol only in moderation. Generally, this means about one drink per day for women and two drinks per day for men.   · Controlling your blood lipids (cholesterol and triglycerides).   · Treating heart problems, if these are contributing to risk.   SEEK MEDICAL CARE IF:   · You are having problems keeping your blood glucose in goal range.   · You notice a change in your vision or new problems with your vision.   · You have wound or sore that does not heal.   · Your blood pressure is above the target range.   Document Released: 12/20/2004 Document Revised: 03/11/2013 Document Reviewed: 05/28/2010  ExitCare® Patient Information ©2013 [employer].      Hyperglycemia  Hyperglycemia is when the sugar (glucose) level in your blood is too high. It may not cause symptoms. If you do have symptoms, they may include warning signs, such as:  · Feeling more thirsty than normal.  · Hunger.  · Feeling tired.  · Needing to pee (urinate) more than normal.  · Blurry eyesight (vision).  You may get other  symptoms as it gets worse, such as:  · Dry mouth.  · Not being hungry (loss of appetite).  · Fruity-smelling breath.  · Weakness.  · Weight gain or loss that is not planned. Weight loss may be fast.  · A tingling or numb feeling in your hands or feet.  · Headache.  · Skin that does not bounce back quickly when it is lightly pinched and released (poor skin turgor).  · Pain in your belly (abdomen).  · Cuts or bruises that heal slowly.  High blood sugar can happen to people who do or do not have diabetes. High blood sugar can happen slowly or quickly, and it can be an emergency.  Follow these instructions at home:  General instructions  · Take over-the-counter and prescription medicines only as told by your doctor.  · Do not use products that contain nicotine or tobacco, such as cigarettes and e-cigarettes. If you need help quitting, ask your doctor.  · Limit alcohol intake to no more than 1 drink per day for nonpregnant women and 2 drinks per day for men. One drink equals 12 oz of beer, 5 oz of wine, or 1½ oz of hard liquor.  · Manage stress. If you need help with this, ask your doctor.  · Keep all follow-up visits as told by your doctor. This is important.  Eating and drinking  · Stay at a healthy weight.  · Exercise regularly, as told by your doctor.  · Drink enough fluid, especially when you:  ¨ Exercise.  ¨ Get sick.  ¨ Are in hot temperatures.  · Eat healthy foods, such as:  ¨ Low-fat (lean) proteins.  ¨ Complex carbs (complex carbohydrates), such as whole wheat bread or brown rice.  ¨ Fresh fruits and vegetables.  ¨ Low-fat dairy products.  ¨ Healthy fats.  · Drink enough fluid to keep your pee (urine) clear or pale yellow.  If you have diabetes:  · Make sure you know the symptoms of hyperglycemia.  · Follow your diabetes management plan, as told by your doctor. Make sure you:  ¨ Take insulin and medicines as told.  ¨ Follow your exercise plan.  ¨ Follow your meal plan. Eat on time. Do not skip meals.  ¨ Check  your blood sugar as often as told. Make sure to check before and after exercise. If you exercise longer or in a different way than you normally do, check your blood sugar more often.  ¨ Follow your sick day plan whenever you cannot eat or drink normally. Make this plan ahead of time with your doctor.  · Share your diabetes management plan with people in your workplace, school, and household.  · Check your urine for ketones when you are ill and as told by your doctor.  · Carry a card or wear jewelry that says that you have diabetes.  Contact a doctor if:  · Your blood sugar level is higher than 240 mg/dL (13.3 mmol/L) for 2 days in a row.  · You have problems keeping your blood sugar in your target range.  · High blood sugar happens often for you.  Get help right away if:  · You have trouble breathing.  · You have a change in how you think, feel, or act (mental status).  · You feel sick to your stomach (nauseous), and that feeling does not go away.  · You cannot stop throwing up (vomiting).  These symptoms may be an emergency. Do not wait to see if the symptoms will go away. Get medical help right away. Call your local emergency services (911 in the [place] Do not drive yourself to the hospital.   Summary  · Hyperglycemia is when the sugar (glucose) level in your blood is too high.  · High blood sugar can happen to people who do or do not have diabetes.  · Make sure you drink enough fluids, eat healthy foods, and exercise regularly.  · Contact your doctor if you have problems keeping your blood sugar in your target range.  This information is not intended to replace advice given to you by your health care provider. Make sure you discuss any questions you have with your health care provider.  Document Released: 10/15/2010 Document Revised: 09/04/2017 Document Reviewed: 09/04/2017  Elsevier Interactive Patient Education © 2017 [employer]

## 2019-01-18 NOTE — ASSESSMENT & PLAN NOTE
Patient is a 73-year-old male with newly diagnosed type 2 diabetes, A1c is elevated at 7.9%.  He does have a very strong history of type 2 diabetes.  He is overweight.  He does not exercise, he has poor diet.  We had a lengthy discussion today regarding medication, he is very resistant to starting medication.

## 2019-01-18 NOTE — ASSESSMENT & PLAN NOTE
Patient is a 73-year-old male with hypertriglyceridemia, as noted previously, he was also found to have elevated fasting glucose and an A1c of 7.9%.  He does admit to a very poor diet, heavy in starches and carbohydrates.

## 2019-01-18 NOTE — PROGRESS NOTES
[facility]  Primary Care Office Visit - Problem-Oriented        History:     Estuardo Christian is a 73 y.o. male who is here today to discuss Blood Pressure Problem and Cold Symptoms (x 1 day)      Type 2 diabetes mellitus without complication, without long-term current use of insulin (HCC)  Patient is a 73-year-old male with newly diagnosed type 2 diabetes, A1c is elevated at 7.9%.  He does have a very strong history of type 2 diabetes.  He is overweight.  He does not exercise, he has poor diet.  We had a lengthy discussion today regarding medication, he is very resistant to starting medication.    Essential hypertension  Patient is a 73-year-old male with hypertension, significantly improved with lisinopril, hydrochlorothiazide along with metoprolol.  No chest pain, shortness of breath, change of vision, headaches.  CMP is normal.    Hypertriglyceridemia  Patient is a 73-year-old male with hypertriglyceridemia, as noted previously, he was also found to have elevated fasting glucose and an A1c of 7.9%.  He does admit to a very poor diet, heavy in starches and carbohydrates.    Low testosterone in male  Patient is a 73-year-old male with low free and total testosterone now on replacement.  Unfortunately due to finances it has been a struggle for him to administer testosterone cypionate 50 mg q. 14 days.  His free and total testosterone are now slightly high, it appears he may have had his labs drawn early?  He feels well on the medication, has had no complications.    Patient also having mild cold symptoms, no fever x 1 day.     Past Medical History:   Diagnosis Date   • CATARACT    • Dental disorder     full dentures   • Heart burn      Past Surgical History:   Procedure Laterality Date   • NASAL FLAP  4/14/2011    Performed by BUTCH MCLEAN at SURGERY SAME DAY [facility]S   • LUMBAR LAMINECTOMY DISKECTOMY  3/25/2009    Performed by [facility] at SURGERY [facility]S   • SHOULDER SURGERY       "right rcr     Social History     Social History   • Marital status:      Spouse name: N/A   • Number of children: N/A   • Years of education: N/A     Occupational History   • Not on file.     Social History Main Topics   • Smoking status: Former Smoker   • Smokeless tobacco: Never Used      Comment: 1993   • Alcohol use No   • Drug use: No   • Sexual activity: Not on file     Other Topics Concern   • Not on file     Social History Narrative   • No narrative on file     History   Smoking Status   • Former Smoker   Smokeless Tobacco   • Never Used     Comment: 1993     No family history on file.  No Known Allergies    Problem List:     Patient Active Problem List    Diagnosis Date Noted   • Type 2 diabetes mellitus without complication, without long-term current use of insulin ([facility]) 01/17/2019   • Hypertriglyceridemia 01/17/2019   • Right ear pain 12/20/2018   • Elevated fasting glucose 12/20/2018   • Essential hypertension 11/27/2018   • COPD suggested by initial evaluation ([facility]) 11/27/2018   • Sleep-disordered breathing 11/27/2018   • Low testosterone in male 11/27/2018   • Obesity (BMI 35.0-39.9 without comorbidity) ([facility]) 06/27/2018         Medications:     Current Outpatient Prescriptions:   •  lisinopril-hydrochlorothiazide (PRINZIDE, ZESTORETIC) 20-12.5 MG per tablet, Take 2 Tabs by mouth every day., Disp: 180 Tab, Rfl: 1  •  metoprolol SR (TOPROL XL) 100 MG TABLET SR 24 HR, Take 2 Tabs by mouth every day., Disp: 60 Tab, Rfl: 5  •  albuterol 108 (90 Base) MCG/ACT Aero Soln inhalation aerosol, Inhale 2 Puffs by mouth every four hours as needed for Shortness of Breath., Disp: 1 Inhaler, Rfl: 1      Review of Systems:     Pertinent positives as per HPI, all other systems reviewed and WNL     Physical Assessment:     VS: /70 (BP Location: Left arm, Patient Position: Sitting, BP Cuff Size: Large adult)   Pulse 63   Temp 36.7 °C (98.1 °F) (Oral)   Resp 18   Ht 1.753 m (5' 9\")   Wt 122.9 kg (271 lb)  "  SpO2 92%   BMI 40.02 kg/m²     General: Well-developed, well-nourished, male, oibes    Head: PERRL, EOMI. Normocephalic, post oropharynx otherwise WNL. No facial asymmetry noted.  Ears: Right TM with serous effusion and erythema.  Neck: Neck supple, no thyromegaly  Cardiovasc:  RRR, no MRG. No thrills or bruits. Pulses 2+ and symmetric at all distal extremities.  Pulmonary: Lungs clear bilaterally.  Normal respiratory effort. No wheeze or crackles.   Extremities: No edema  Neuro: Alert and oriented, CNs II-XII intact, no focal deficits.  Skin:No rashes noted. Skin warm, dry, intact    Psych: Dressed appropriately for the weather, pleasant and conversant.  Affect, mood & judgment appropriate.    Assessment/Plan:   Estuardo was seen today for blood pressure problem and cold symptoms.    Diagnoses and all orders for this visit:    Type 2 diabetes mellitus without complication, without long-term current use of insulin (HCC), new, uncontrolled   -Would recommend starting oral antidiabetic, patient is very resistant to pharmacotherapy at this point.  He also admits that he will not work on weight loss, exercise or changing his diet.  Again, very resistant to prescription today, would like to see where his A1c is at in 3 months.  -     HEMOGLOBIN A1C; Future  -     Lipid Profile; Future  -     BASIC METABOLIC PANEL; Future    Essential hypertension, chronic, well controlled on present treatment, continue to monitor  -     BASIC METABOLIC PANEL; Future    Hypertriglyceridemia, uncontrolled, discussed dietary modifications, weight loss, monitor    Low testosterone in male  -Testosterone now slightly elevated, will trial 100 mg IM once a month, repeat labs prior to follow-up appointment, 14 days following injection.  Titrate as appropriate.  -     testosterone cypionate (DEPO-TESTOSTERONE) injection 100 mg; 0.5 mL by Intramuscular route Once.  -     TESTOSTERONE F&T MALE ADULT; Future      Common cold: Recommend OTC multi  symptom medication, follow up if sx worsen or persist > 5-7 days or if fevers.     Patient is agreeable to the above plan and voiced understanding. All questions answered.     Please note that this dictation was created using voice recognition software. I have made every reasonable attempt to correct obvious errors, but I expect that there are errors of grammar and possibly content that I did not discover before finalizing the note.      VALENTIN Santana  1/17/2019, 4:02 PM

## 2019-01-18 NOTE — ASSESSMENT & PLAN NOTE
Patient is a 73-year-old male with hypertension, significantly improved with lisinopril, hydrochlorothiazide along with metoprolol.  No chest pain, shortness of breath, change of vision, headaches.  CMP is normal.

## 2019-01-18 NOTE — ASSESSMENT & PLAN NOTE
Patient is a 73-year-old male with low free and total testosterone now on replacement.  Unfortunately due to finances it has been a struggle for him to administer testosterone cypionate 50 mg q. 14 days.  His free and total testosterone are now slightly high, it appears he may have had his labs drawn early?  He feels well on the medication, has had no complications.

## 2019-02-19 ENCOUNTER — NON-PROVIDER VISIT (OUTPATIENT)
Dept: MEDICAL GROUP | Facility: PHYSICIAN GROUP | Age: 74
End: 2019-02-19
Payer: MEDICARE

## 2019-02-19 DIAGNOSIS — R79.89 LOW TESTOSTERONE: ICD-10-CM

## 2019-02-19 DIAGNOSIS — N50.89 OTHER SPECIFIED DISORDERS OF THE MALE GENITAL ORGANS: ICD-10-CM

## 2019-02-19 PROCEDURE — 99999 PR NO CHARGE: CPT | Performed by: NURSE PRACTITIONER

## 2019-02-19 PROCEDURE — 96372 THER/PROPH/DIAG INJ SC/IM: CPT | Performed by: PHYSICIAN ASSISTANT

## 2019-02-19 RX ORDER — TESTOSTERONE CYPIONATE 200 MG/ML
50 INJECTION, SOLUTION INTRAMUSCULAR ONCE
Status: COMPLETED | OUTPATIENT
Start: 2019-02-19 | End: 2019-02-19

## 2019-02-19 RX ADMIN — TESTOSTERONE CYPIONATE 50 MG: 200 INJECTION, SOLUTION INTRAMUSCULAR at 16:16

## 2019-03-18 ENCOUNTER — NON-PROVIDER VISIT (OUTPATIENT)
Dept: MEDICAL GROUP | Facility: PHYSICIAN GROUP | Age: 74
End: 2019-03-18
Payer: MEDICARE

## 2019-03-18 DIAGNOSIS — R79.89 LOW TESTOSTERONE: ICD-10-CM

## 2019-03-18 PROCEDURE — 99999 PR NO CHARGE: CPT | Performed by: NURSE PRACTITIONER

## 2019-03-18 PROCEDURE — 96372 THER/PROPH/DIAG INJ SC/IM: CPT | Performed by: NURSE PRACTITIONER

## 2019-03-18 RX ORDER — TESTOSTERONE CYPIONATE 200 MG/ML
100 INJECTION, SOLUTION INTRAMUSCULAR ONCE
Status: COMPLETED | OUTPATIENT
Start: 2019-03-18 | End: 2019-03-18

## 2019-03-18 RX ADMIN — TESTOSTERONE CYPIONATE 100 MG: 200 INJECTION, SOLUTION INTRAMUSCULAR at 16:26

## 2019-03-18 NOTE — NON-PROVIDER
Estuardo Christian is a 73 y.o. male here for a non-provider visit for Testosterone injection.    Reason for injection: Low testosterone  Order in MAR?: Yes  Patient supplied?:Yes  Minimum interval has been met for this injection (per MAR order): Yes    Order and dose verified by: Glen  Patient tolerated injection and no adverse effects were observed or reported: Yes    # of Administrations remaining in MAR: 0    Pt states he was to be getting the full dose as you said that he would be going to one injection once a month instead of two. Please advise.

## 2019-04-18 ENCOUNTER — OFFICE VISIT (OUTPATIENT)
Dept: MEDICAL GROUP | Facility: PHYSICIAN GROUP | Age: 74
End: 2019-04-18
Payer: MEDICARE

## 2019-04-18 VITALS
OXYGEN SATURATION: 96 % | RESPIRATION RATE: 14 BRPM | HEIGHT: 69 IN | BODY MASS INDEX: 36.73 KG/M2 | DIASTOLIC BLOOD PRESSURE: 60 MMHG | WEIGHT: 248 LBS | TEMPERATURE: 98.7 F | SYSTOLIC BLOOD PRESSURE: 120 MMHG | HEART RATE: 54 BPM

## 2019-04-18 DIAGNOSIS — R79.89 LOW TESTOSTERONE IN MALE: ICD-10-CM

## 2019-04-18 DIAGNOSIS — B96.89 BACTERIAL SINUSITIS: ICD-10-CM

## 2019-04-18 DIAGNOSIS — E11.9 TYPE 2 DIABETES MELLITUS WITHOUT COMPLICATION, WITHOUT LONG-TERM CURRENT USE OF INSULIN (HCC): ICD-10-CM

## 2019-04-18 DIAGNOSIS — J32.9 BACTERIAL SINUSITIS: ICD-10-CM

## 2019-04-18 DIAGNOSIS — I10 ESSENTIAL HYPERTENSION: ICD-10-CM

## 2019-04-18 PROCEDURE — 99214 OFFICE O/P EST MOD 30 MIN: CPT | Mod: 25 | Performed by: NURSE PRACTITIONER

## 2019-04-18 RX ORDER — AMOXICILLIN AND CLAVULANATE POTASSIUM 875; 125 MG/1; MG/1
1 TABLET, FILM COATED ORAL 2 TIMES DAILY
Qty: 14 TAB | Refills: 0 | Status: SHIPPED | OUTPATIENT
Start: 2019-04-18 | End: 2019-04-25

## 2019-04-18 RX ORDER — TESTOSTERONE CYPIONATE 200 MG/ML
100 INJECTION, SOLUTION INTRAMUSCULAR ONCE
Status: COMPLETED | OUTPATIENT
Start: 2019-04-18 | End: 2019-04-18

## 2019-04-18 RX ORDER — TESTOSTERONE CYPIONATE 200 MG/ML
100 INJECTION, SOLUTION INTRAMUSCULAR ONCE
Qty: 10 ML | Refills: 5 | Status: SHIPPED
Start: 2019-04-18 | End: 2019-11-12 | Stop reason: SDUPTHER

## 2019-04-18 RX ADMIN — TESTOSTERONE CYPIONATE 100 MG: 200 INJECTION, SOLUTION INTRAMUSCULAR at 13:36

## 2019-04-18 NOTE — ASSESSMENT & PLAN NOTE
Patient is a 73-year-old male with hypertension here for follow-up.  He has lost about 25 pounds since our last visit in January.  His blood pressure is 120/60, pulse is 54, he occasionally forgets to take his second metoprolol during the day.  He has had blood pressure readings at home as low as 109/70.  He has not had any chest pain, shortness of breath, change of vision, headaches

## 2019-04-18 NOTE — ASSESSMENT & PLAN NOTE
As previously noted, patient was diagnosed with type 2 diabetes last December.  He has lost over 25 pounds since her last office visit.  Completely changed his diet, not exercising.  We did discuss repeating labs.  He is currently not on any oral antidiabetics.

## 2019-04-18 NOTE — PROGRESS NOTES
[place] [facility]  Primary Care Office Visit - Problem-Oriented        History:     Estuardo Christian is a 73 y.o. male who is here today to discuss Sinus Problem (x1wk nasal pressure, pain R ear, dental pain) and Injections (testosterone)      Bacterial sinusitis  Patient is a 74 y/o man with history of frequent sinus infections. Has had right ear pain and fullness, right upper gum pain, sinus HA and right sided sinus pressure and congestion worsening x 1 week. 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           Essential hypertension  Patient is a 73-year-old male with hypertension here for follow-up.  He has lost about 25 pounds since our last visit in January.  His blood pressure is 120/60, pulse is 54, he occasionally forgets to take his second metoprolol during the day.  He has had blood pressure readings at home as low as 109/70.  He has not had any chest pain, shortness of breath, change of vision, headaches    Type 2 diabetes mellitus without complication, without long-term current use of insulin ([facility])  As previously noted, patient was diagnosed with type 2 diabetes last December.  He has lost over 25 pounds since her last office visit.  Completely changed his diet, not exercising.  We did discuss repeating labs.  He is currently not on any oral antidiabetics.    Low testosterone in male  This is a chronic condition which is well controlled on medications. Patient is tolerating medications without side effects.          Past Medical History:   Diagnosis Date   • CATARACT    • Dental disorder     full dentures   • Heart burn      Past Surgical History:   Procedure Laterality Date   • NASAL FLAP  4/14/2011    Performed by BUTCH MCLEAN at SURGERY SAME DAY [facility] ORS   • LUMBAR LAMINECTOMY DISKECTOMY  3/25/2009    Performed by RANULFO ASH at SURGERY [facility] ORS   • SHOULDER SURGERY      right rcr     Social History     Social History   • Marital status:      Spouse name: N/A   • Number of  children: N/A   • Years of education: N/A     Occupational History   • Not on file.     Social History Main Topics   • Smoking status: Former Smoker   • Smokeless tobacco: Never Used      Comment: 1993   • Alcohol use No   • Drug use: No   • Sexual activity: Not on file     Other Topics Concern   • Not on file     Social History Narrative   • No narrative on file     History   Smoking Status   • Former Smoker   Smokeless Tobacco   • Never Used     Comment: 1993     History reviewed. No pertinent family history.  No Known Allergies    Problem List:     Patient Active Problem List    Diagnosis Date Noted   • Bacterial sinusitis 04/18/2019   • Type 2 diabetes mellitus without complication, without long-term current use of insulin ([facility]) 01/17/2019   • Hypertriglyceridemia 01/17/2019   • Right ear pain 12/20/2018   • Elevated fasting glucose 12/20/2018   • Essential hypertension 11/27/2018   • COPD suggested by initial evaluation ([facility]) 11/27/2018   • Sleep-disordered breathing 11/27/2018   • Low testosterone in male 11/27/2018   • Obesity (BMI 35.0-39.9 without comorbidity) ([facility]) 06/27/2018         Medications:     Current Outpatient Prescriptions:   •  TESTOSTERONE CYPIONATE IM, by Intramuscular route., Disp: , Rfl:   •  amoxicillin-clavulanate (AUGMENTIN) 875-125 MG Tab, Take 1 Tab by mouth 2 times a day for 7 days., Disp: 14 Tab, Rfl: 0  •  testosterone cypionate (DEPO-TESTOSTERONE) 200 MG/ML Solution injection, 0.5 mL by Intramuscular route Once for 1 dose. Administer 0.5mL IM every 30 days until gone., Disp: 10 mL, Rfl: 5  •  lisinopril-hydrochlorothiazide (PRINZIDE, ZESTORETIC) 20-12.5 MG per tablet, Take 2 Tabs by mouth every day., Disp: 180 Tab, Rfl: 1  •  metoprolol SR (TOPROL XL) 100 MG TABLET SR 24 HR, Take 2 Tabs by mouth every day., Disp: 60 Tab, Rfl: 5  •  albuterol 108 (90 Base) MCG/ACT Aero Soln inhalation aerosol, Inhale 2 Puffs by mouth every four hours as needed for Shortness of Breath., Disp: 1  "Inhaler, Rfl: 1      Review of Systems:     Pertinent positives as per HPI, all other systems reviewed and WNL     Physical Assessment:     VS: /60 (BP Location: Left arm, Patient Position: Sitting, BP Cuff Size: Adult)   Pulse (!) 54   Temp 37.1 °C (98.7 °F)   Resp 14   Ht 1.753 m (5' 9\")   Wt 112.5 kg (248 lb)   SpO2 96%   BMI 36.62 kg/m²     General: Well-developed, well-nourished, male, obese    Head: PERRL, EOMI. Normocephalic. No facial asymmetry noted.  ENT: bilat TM with seroud effusion, right maxillary sinus pain with tap, post oropharynx normal, nasal mucosa edematous and erythematous.   Cardiovasc:RRR, no MRG. No thrills or bruits. Pulses 2+ and symmetric at all distal extremities.  Pulmonary: Lungs clear bilaterally.  Normal respiratory effort. No wheeze or crackles.   Extremities: No edema  Neuro: Alert and oriented, CNs II-XII intact, no focal deficits.  Skin:No rashes noted. Skin warm, dry, intact    Psych: Dressed appropriately for the weather, pleasant and conversant.  Affect, mood & judgment appropriate.    Assessment/Plan:   Estuardo was seen today for sinus problem and injections.    Diagnoses and all orders for this visit:    Low testosterone in male  - well controlled on IM testosterone once monthly, labs have been appropriate. Refill x 6 months. Follow up monthly for administration.   -     testosterone cypionate (DEPO-TESTOSTERONE) injection 100 mg; 0.5 mL by Intramuscular route Once.  -     testosterone cypionate (DEPO-TESTOSTERONE) 200 MG/ML Solution injection; 0.5 mL by Intramuscular route Once for 1 dose. Administer 0.5mL IM every 30 days until gone.    Bacterial sinusitis, new   - nasal wash kit, flonase, OTC multi sx such as mucinex, DM, tylenol, abx x 7 days. Follow up if symptoms worsen or persist   -     amoxicillin-clavulanate (AUGMENTIN) 875-125 MG Tab; Take 1 Tab by mouth 2 times a day for 7 days.    Essential hypertension, chronic, controlled   - given low normal BP and " weight loss will back off on metop to once daily, follow up in 7-10 days for BP check, may be able to discontinue completely with continued TLM.     Type 2 diabetes mellitus without complication, without long-term current use of insulin (HCC), unclear control, positive reinforcement for weight loss, follow up with labs           Patient is agreeable to the above plan and voiced understanding. All questions answered.     Please note that this dictation was created using voice recognition software. I have made every reasonable attempt to correct obvious errors, but I expect that there are errors of grammar and possibly content that I did not discover before finalizing the note.      VALENITN Santana  4/18/2019, 2:13 PM

## 2019-04-18 NOTE — ASSESSMENT & PLAN NOTE
Patient is a 72 y/o man with history of frequent sinus infections. Has had right ear pain and fullness, right upper gum pain, sinus HA and right sided sinus pressure and congestion worsening x 1 week. 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

## 2019-05-01 ENCOUNTER — HOSPITAL ENCOUNTER (OUTPATIENT)
Dept: LAB | Facility: MEDICAL CENTER | Age: 74
End: 2019-05-01
Attending: NURSE PRACTITIONER
Payer: MEDICARE

## 2019-05-01 DIAGNOSIS — I10 ESSENTIAL HYPERTENSION: ICD-10-CM

## 2019-05-01 DIAGNOSIS — R79.89 LOW TESTOSTERONE IN MALE: ICD-10-CM

## 2019-05-01 DIAGNOSIS — E11.9 TYPE 2 DIABETES MELLITUS WITHOUT COMPLICATION, WITHOUT LONG-TERM CURRENT USE OF INSULIN (HCC): ICD-10-CM

## 2019-05-01 LAB
ANION GAP SERPL CALC-SCNC: 7 MMOL/L (ref 0–11.9)
BUN SERPL-MCNC: 18 MG/DL (ref 8–22)
CALCIUM SERPL-MCNC: 9.3 MG/DL (ref 8.5–10.5)
CHLORIDE SERPL-SCNC: 107 MMOL/L (ref 96–112)
CHOLEST SERPL-MCNC: 159 MG/DL (ref 100–199)
CO2 SERPL-SCNC: 27 MMOL/L (ref 20–33)
CREAT SERPL-MCNC: 0.97 MG/DL (ref 0.5–1.4)
EST. AVERAGE GLUCOSE BLD GHB EST-MCNC: 146 MG/DL
FASTING STATUS PATIENT QL REPORTED: NORMAL
GLUCOSE SERPL-MCNC: 96 MG/DL (ref 65–99)
HBA1C MFR BLD: 6.7 % (ref 0–5.6)
HDLC SERPL-MCNC: 35 MG/DL
LDLC SERPL CALC-MCNC: 75 MG/DL
POTASSIUM SERPL-SCNC: 4.5 MMOL/L (ref 3.6–5.5)
SODIUM SERPL-SCNC: 141 MMOL/L (ref 135–145)
TRIGL SERPL-MCNC: 246 MG/DL (ref 0–149)

## 2019-05-01 PROCEDURE — 80061 LIPID PANEL: CPT

## 2019-05-01 PROCEDURE — 84403 ASSAY OF TOTAL TESTOSTERONE: CPT

## 2019-05-01 PROCEDURE — 36415 COLL VENOUS BLD VENIPUNCTURE: CPT

## 2019-05-01 PROCEDURE — 84270 ASSAY OF SEX HORMONE GLOBUL: CPT

## 2019-05-01 PROCEDURE — 80048 BASIC METABOLIC PNL TOTAL CA: CPT

## 2019-05-01 PROCEDURE — 83036 HEMOGLOBIN GLYCOSYLATED A1C: CPT

## 2019-05-03 LAB
SHBG SERPL-SCNC: 60 NMOL/L (ref 11–80)
TESTOST FREE MFR SERPL: 1.2 % (ref 1.6–2.9)
TESTOST FREE SERPL-MCNC: 39 PG/ML (ref 47–244)
TESTOST SERPL-MCNC: 317 NG/DL (ref 300–720)

## 2019-05-09 ENCOUNTER — TELEPHONE (OUTPATIENT)
Dept: MEDICAL GROUP | Facility: PHYSICIAN GROUP | Age: 74
End: 2019-05-09

## 2019-05-09 NOTE — TELEPHONE ENCOUNTER
----- Message from CATE Rincon sent at 5/9/2019  3:38 PM PDT -----  Please let patient know that his testosterone is stable.   A1c and cholesterol have improved with diet and weight loss, congrats!   Needs to reschedule to estab care with new pcp

## 2019-05-30 ENCOUNTER — OFFICE VISIT (OUTPATIENT)
Dept: URGENT CARE | Facility: PHYSICIAN GROUP | Age: 74
End: 2019-05-30
Payer: MEDICARE

## 2019-05-30 VITALS
BODY MASS INDEX: 36.73 KG/M2 | OXYGEN SATURATION: 92 % | HEART RATE: 64 BPM | TEMPERATURE: 96.8 F | RESPIRATION RATE: 16 BRPM | HEIGHT: 69 IN | WEIGHT: 248 LBS | SYSTOLIC BLOOD PRESSURE: 120 MMHG | DIASTOLIC BLOOD PRESSURE: 68 MMHG

## 2019-05-30 DIAGNOSIS — J01.40 ACUTE NON-RECURRENT PANSINUSITIS: Primary | ICD-10-CM

## 2019-05-30 PROCEDURE — 99214 OFFICE O/P EST MOD 30 MIN: CPT | Performed by: PHYSICIAN ASSISTANT

## 2019-05-30 RX ORDER — FLUTICASONE PROPIONATE 50 MCG
1 SPRAY, SUSPENSION (ML) NASAL DAILY
Qty: 16 G | Refills: 0 | Status: SHIPPED | OUTPATIENT
Start: 2019-05-30 | End: 2019-06-06

## 2019-05-30 RX ORDER — AMOXICILLIN AND CLAVULANATE POTASSIUM 875; 125 MG/1; MG/1
1 TABLET, FILM COATED ORAL 2 TIMES DAILY
Qty: 14 TAB | Refills: 0 | Status: SHIPPED | OUTPATIENT
Start: 2019-06-04 | End: 2019-06-11

## 2019-05-30 ASSESSMENT — ENCOUNTER SYMPTOMS
FEVER: 0
COUGH: 1
CONSTIPATION: 0
SORE THROAT: 0
WHEEZING: 0
DIARRHEA: 0
CHILLS: 0
ABDOMINAL PAIN: 0
VOMITING: 0
RHINORRHEA: 0
SHORTNESS OF BREATH: 0
SPUTUM PRODUCTION: 0
NAUSEA: 0

## 2019-05-30 NOTE — PATIENT INSTRUCTIONS
Start Flonase and zyrtec every day during allergy season. Saline rinse at night.     Tylenol cold and sinus for 4 days           Nasal Allergies  Introduction  Nasal allergies are a reaction to allergens in the air. Allergens are tiny specks (particles) in the air that cause your body to have an allergic reaction. Nasal allergies are not passed from person to person (contagious). They cannot be cured, but they can be controlled. Common causes of nasal allergies include:  · Pollen from grasses, trees, and weeds.  · House dust mites.  · Pet dander.  · Mold.  Follow these instructions at home:  · Avoid the allergen that is causing your symptoms, if you can.  · Keep windows closed. If possible, use air conditioning when there is a lot of pollen in the air.  · Do not use fans in your home.  · Do not hang clothes outside to dry.  · Wear sunglasses to keep pollen out of your eyes.  · Wash your hands right away after you touch household pets.  · Take over-the-counter and prescription medicines only as told by your doctor.  · Keep all follow-up visits as told by your doctor. This is important.  Contact a doctor if:  · You have a fever.  · You have a cough that does not go away (is persistent).  · You start to make whistling sounds when you breathe (wheeze).  · Your symptoms do not get better with treatment.  · You have thick fluid coming from your nose.  · You start to have nosebleeds.  Get help right away if:  · Your tongue or your lips are swollen.  · You have trouble breathing.  · You feel light-headed or you feel like you are going to pass out (faint).  · You have cold sweats.  This information is not intended to replace advice given to you by your health care provider. Make sure you discuss any questions you have with your health care provider.  Document Released: 04/18/2012 Document Revised: 05/25/2017 Document Reviewed: 06/29/2016  © 2017 [employer]

## 2019-05-30 NOTE — PROGRESS NOTES
Subjective:   Estuardo Christian is a 73 y.o. male who presents for Otalgia        Otalgia    There is pain in the left ear. This is a new problem. The current episode started 1 to 4 weeks ago. The problem occurs constantly. The problem has been waxing and waning. There has been no fever. Associated symptoms include coughing. Pertinent negatives include no abdominal pain, diarrhea, ear discharge, hearing loss, rhinorrhea, sore throat or vomiting. Treatments tried: Tylenol cold and sinus  The treatment provided significant relief. His past medical history is significant for a chronic ear infection.     Hx of allergic rhinitis and sinusitis in past.     Review of Systems   Constitutional: Negative for chills, fever and malaise/fatigue.   HENT: Positive for congestion and ear pain. Negative for ear discharge, hearing loss, rhinorrhea and sore throat.    Respiratory: Positive for cough. Negative for sputum production, shortness of breath and wheezing.    Gastrointestinal: Negative for abdominal pain, constipation, diarrhea, nausea and vomiting.   All other systems reviewed and are negative.      PMH:  has a past medical history of CATARACT; Dental disorder; and Heart burn.  MEDS:   Current Outpatient Prescriptions:   •  fluticasone (FLONASE) 50 MCG/ACT nasal spray, Spray 1 Spray in nose every day for 7 days., Disp: 16 g, Rfl: 0  •  [START ON 6/4/2019] amoxicillin-clavulanate (AUGMENTIN) 875-125 MG Tab, Take 1 Tab by mouth 2 times a day for 7 days., Disp: 14 Tab, Rfl: 0  •  TESTOSTERONE CYPIONATE IM, by Intramuscular route., Disp: , Rfl:   •  lisinopril-hydrochlorothiazide (PRINZIDE, ZESTORETIC) 20-12.5 MG per tablet, Take 2 Tabs by mouth every day., Disp: 180 Tab, Rfl: 1  •  metoprolol SR (TOPROL XL) 100 MG TABLET SR 24 HR, Take 2 Tabs by mouth every day., Disp: 60 Tab, Rfl: 5  •  albuterol 108 (90 Base) MCG/ACT Aero Soln inhalation aerosol, Inhale 2 Puffs by mouth every four hours as needed for Shortness of Breath.,  "Disp: 1 Inhaler, Rfl: 1  ALLERGIES: No Known Allergies  SURGHX:   Past Surgical History:   Procedure Laterality Date   • NASAL FLAP  4/14/2011    Performed by BUTCH MCLEAN at SURGERY SAME DAY [facility] ORS   • LUMBAR LAMINECTOMY DISKECTOMY  3/25/2009    Performed by RANULFO ASH at SURGERY [facility] ORS   • SHOULDER SURGERY      right rcr     SOCHX:  reports that he has quit smoking. He has never used smokeless tobacco. He reports that he does not drink alcohol or use drugs.  History reviewed. No pertinent family history.     Objective:   /68 (BP Location: Right arm, Patient Position: Sitting, BP Cuff Size: Large adult)   Pulse 64   Temp 36 °C (96.8 °F) (Temporal)   Resp 16   Ht 1.753 m (5' 9\")   Wt 112.5 kg (248 lb)   SpO2 92%   BMI 36.62 kg/m²     Physical Exam   Constitutional: He is oriented to person, place, and time. He appears well-developed and well-nourished. No distress.   HENT:   Head: Normocephalic and atraumatic.   Right Ear: Ear canal normal. Tympanic membrane is erythematous and retracted.   Left Ear: Ear canal normal. Tympanic membrane is erythematous and retracted.   Nose: Mucosal edema and rhinorrhea present.   Mouth/Throat: Uvula is midline, oropharynx is clear and moist and mucous membranes are normal.   Eyes: Pupils are equal, round, and reactive to light. Conjunctivae are normal.   Neck: Normal range of motion. Neck supple. No tracheal deviation present.   Cardiovascular: Normal rate and regular rhythm.    Pulmonary/Chest: Effort normal and breath sounds normal. No respiratory distress. He has no wheezes. He has no rales.   Lymphadenopathy:     He has cervical adenopathy.   Neurological: He is alert and oriented to person, place, and time.   Skin: Skin is warm and dry. Capillary refill takes less than 2 seconds.   Psychiatric: He has a normal mood and affect. His behavior is normal.   Vitals reviewed.        Assessment/Plan:     1. Acute non-recurrent pansinusitis  " fluticasone (FLONASE) 50 MCG/ACT nasal spray    amoxicillin-clavulanate (AUGMENTIN) 875-125 MG Tab     Supportive care reviewed. Start Flonase, Zyrtec and saline rinse. OTC decongestant x 4 days. Patient was given a contingent antibiotic prescription dated for 6/4/19 to fill and use as directed if symptoms progressed as discussed and agreed upon. Educational handout on allergic rhinitis provided.     Follow-up with primary care provider as scheduled tomorrow.  If symptoms worsen or persist patient can return to clinic for reevaluation.  Red flags and emergency room precautions discussed. All side effects of medication discussed including allergic response, GI upset, tendon injury, etc. Patient verbalized understanding of information.    Please note that this dictation was created using voice recognition software. I have made every reasonable attempt to correct obvious errors, but I expect that there are errors of grammar and possibly content that I did not discover before finalizing the note.

## 2019-05-31 ENCOUNTER — OFFICE VISIT (OUTPATIENT)
Dept: MEDICAL GROUP | Facility: PHYSICIAN GROUP | Age: 74
End: 2019-05-31
Payer: MEDICARE

## 2019-05-31 VITALS
SYSTOLIC BLOOD PRESSURE: 132 MMHG | TEMPERATURE: 98.6 F | BODY MASS INDEX: 36.43 KG/M2 | OXYGEN SATURATION: 94 % | WEIGHT: 246 LBS | RESPIRATION RATE: 14 BRPM | DIASTOLIC BLOOD PRESSURE: 74 MMHG | HEIGHT: 69 IN | HEART RATE: 57 BPM

## 2019-05-31 DIAGNOSIS — I10 ESSENTIAL HYPERTENSION: ICD-10-CM

## 2019-05-31 DIAGNOSIS — E11.9 TYPE 2 DIABETES MELLITUS WITHOUT COMPLICATION, WITHOUT LONG-TERM CURRENT USE OF INSULIN (HCC): ICD-10-CM

## 2019-05-31 DIAGNOSIS — R79.89 LOW TESTOSTERONE IN MALE: ICD-10-CM

## 2019-05-31 DIAGNOSIS — E66.9 OBESITY (BMI 35.0-39.9 WITHOUT COMORBIDITY): ICD-10-CM

## 2019-05-31 DIAGNOSIS — E78.1 HYPERTRIGLYCERIDEMIA: ICD-10-CM

## 2019-05-31 DIAGNOSIS — J44.9 COPD SUGGESTED BY INITIAL EVALUATION (HCC): ICD-10-CM

## 2019-05-31 PROBLEM — H92.01 RIGHT EAR PAIN: Status: RESOLVED | Noted: 2018-12-20 | Resolved: 2019-05-31

## 2019-05-31 PROBLEM — R73.01 ELEVATED FASTING GLUCOSE: Status: RESOLVED | Noted: 2018-12-20 | Resolved: 2019-05-31

## 2019-05-31 PROCEDURE — 96372 THER/PROPH/DIAG INJ SC/IM: CPT | Performed by: NURSE PRACTITIONER

## 2019-05-31 PROCEDURE — 99999 PR NO CHARGE: CPT | Performed by: NURSE PRACTITIONER

## 2019-05-31 PROCEDURE — 99214 OFFICE O/P EST MOD 30 MIN: CPT | Mod: 25 | Performed by: NURSE PRACTITIONER

## 2019-05-31 RX ORDER — METOPROLOL SUCCINATE 100 MG/1
100 TABLET, EXTENDED RELEASE ORAL DAILY
Qty: 30 TAB | Refills: 5 | Status: SHIPPED | OUTPATIENT
Start: 2019-05-31 | End: 2019-11-12 | Stop reason: SDUPTHER

## 2019-05-31 RX ORDER — LISINOPRIL AND HYDROCHLOROTHIAZIDE 20; 12.5 MG/1; MG/1
2 TABLET ORAL DAILY
Qty: 180 TAB | Refills: 1 | Status: SHIPPED | OUTPATIENT
Start: 2019-05-31 | End: 2019-12-16

## 2019-05-31 RX ORDER — TESTOSTERONE CYPIONATE 200 MG/ML
100 INJECTION, SOLUTION INTRAMUSCULAR ONCE
Status: COMPLETED | OUTPATIENT
Start: 2019-05-31 | End: 2019-05-31

## 2019-05-31 RX ADMIN — TESTOSTERONE CYPIONATE 100 MG: 200 INJECTION, SOLUTION INTRAMUSCULAR at 10:28

## 2019-05-31 NOTE — ASSESSMENT & PLAN NOTE
This is a chronic health problem that is well controlled with current medications and lifestyle measures. Getting testosterone injections one to two times a month for last year and a half.  Denies fatigue.    Component      Latest Ref Rng & Units 12/17/2018 12/27/2018 5/1/2019           7:32 AM  7:05 AM  7:24 AM   Testosterone,Total      300 - 720 ng/dL 265 (L) 1262 (H) 317   Sex Hormone Bind Globulin      11 - 80 nmol/L 47 39 60   Free Testosterone      47 - 244 pg/mL 38 (L) 261 (H) 39 (L)   Testosterone % Free      1.6 - 2.9 % 1.4 (L) 2.1 1.2 (L)

## 2019-05-31 NOTE — ASSESSMENT & PLAN NOTE
This is a chronic health problem that is well controlled with current lifestyle measures.  Patient has been working on lifestyle measures including weight loss, low carbohydrate diet, routine aerobic exercise.  Hemoglobin A1c has greatly improved to 6.7.  Component      Latest Ref Rng & Units 12/27/2018 5/1/2019           7:05 AM  7:24 AM   Glycohemoglobin      0.0 - 5.6 % 7.9 (H) 6.7 (H)   Estim. Avg Glu      mg/dL 180 146

## 2019-05-31 NOTE — PROGRESS NOTES
CC: To establish care, testosterone injection, diabetes    HISTORY OF THE PRESENT ILLNESS: Patient is a 73 y.o. male. This pleasant patient is here today to establish care, lab review, and for evaluation management the following health problems.  Patient was seen in urgent care yesterday for sinus congestion.  He reports improved results already with use of Flonase nasal spray and Zyrtec.    Health Maintenance: Reviewed options and rationale of colon cancer screening.  Patient declines.      Low testosterone in male  This is a chronic health problem that is well controlled with current medications and lifestyle measures. Getting testosterone injections one to two times a month for last year and a half.  Denies fatigue.    Component      Latest Ref Rng & Units 12/17/2018 12/27/2018 5/1/2019           7:32 AM  7:05 AM  7:24 AM   Testosterone,Total      300 - 720 ng/dL 265 (L) 1262 (H) 317   Sex Hormone Bind Globulin      11 - 80 nmol/L 47 39 60   Free Testosterone      47 - 244 pg/mL 38 (L) 261 (H) 39 (L)   Testosterone % Free      1.6 - 2.9 % 1.4 (L) 2.1 1.2 (L)       Type 2 diabetes mellitus without complication, without long-term current use of insulin (HCC)  This is a chronic health problem that is well controlled with current lifestyle measures.  Patient has been working on lifestyle measures including weight loss, low carbohydrate diet, routine aerobic exercise.  Hemoglobin A1c has greatly improved to 6.7.  Component      Latest Ref Rng & Units 12/27/2018 5/1/2019           7:05 AM  7:24 AM   Glycohemoglobin      0.0 - 5.6 % 7.9 (H) 6.7 (H)   Estim. Avg Glu      mg/dL 180 146         Essential hypertension  This is a chronic health problem that is well controlled with current medications and lifestyle measures.  Taking metoprolol  mg once daily and lisinopril-hydrochlorothiazide 20-12.5 mg 2 tabs daily.  Blood pressure readings at home average 120s/70s.  Blood pressure today is 132/74.  The patient  denies chest pain, shortness of breath, headache, vision changes, epistaxis, or dyspnea on exertion.  Patient continues to work on weight loss and routine aerobic exercise.  Reviewed DASH diet.      Obesity (BMI 35.0-39.9 without comorbidity) ([facility])  Patient has lost 30 pounds in the last year and a half by working on diet and exercise.  He is continuing to wor at weight loss.    COPD suggested by initial evaluation ([facility])  This is a chronic health problem that is well controlled with current medications and lifestyle measures.  Diagnosed by outside provider many years ago.  Patient quit smoking over 20 years ago.  Does not use inhalers.  Denies wheezing, cough, shortness of breath.    Hypertriglyceridemia  Chronic health problem, improving control with lifestyle measures.    Component      Latest Ref Rng & Units 12/27/2018 5/1/2019           7:05 AM  7:24 AM   Cholesterol,Tot      100 - 199 mg/dL 162 159   Triglycerides      0 - 149 mg/dL 393 (H) 246 (H)   HDL      >=40 mg/dL 31 (A) 35 (A)   LDL      <100 mg/dL 52 75       Allergies: Patient has no known allergies.    Current Outpatient Prescriptions Ordered in [facility]   Medication Sig Dispense Refill   • metoprolol SR (TOPROL XL) 100 MG TABLET SR 24 HR Take 1 Tab by mouth every day. 30 Tab 5   • lisinopril-hydrochlorothiazide (PRINZIDE, ZESTORETIC) 20-12.5 MG per tablet Take 2 Tabs by mouth every day. 180 Tab 1   • fluticasone (FLONASE) 50 MCG/ACT nasal spray Spray 1 Spray in nose every day for 7 days. 16 g 0   • [START ON 6/4/2019] amoxicillin-clavulanate (AUGMENTIN) 875-125 MG Tab Take 1 Tab by mouth 2 times a day for 7 days. 14 Tab 0   • albuterol 108 (90 Base) MCG/ACT Aero Soln inhalation aerosol Inhale 2 Puffs by mouth every four hours as needed for Shortness of Breath. 1 Inhaler 1     No current Epic-ordered facility-administered medications on file.        Past Medical History:   Diagnosis Date   • CATARACT    • Dental disorder     full dentures   • Heart burn   "      Past Surgical History:   Procedure Laterality Date   • NASAL FLAP  4/14/2011    Performed by BUTCH MCLEAN at SURGERY SAME DAY [facility] ORS   • LUMBAR LAMINECTOMY DISKECTOMY  3/25/2009    Performed by RANULFO ASH at SURGERY [facility] ORS   • SHOULDER SURGERY      right rcr       Social History   Substance Use Topics   • Smoking status: Former Smoker   • Smokeless tobacco: Never Used      Comment: 1993   • Alcohol use No       History reviewed. No pertinent family history.    ROS:   As in HPI, otherwise negative for chest pain, dyspnea, abdominal pain, dysuria, blood in stool, fever          Exam: /74 (BP Location: Left arm, Patient Position: Sitting, BP Cuff Size: Adult)   Pulse (!) 57   Temp 37 °C (98.6 °F)   Resp 14   Ht 1.753 m (5' 9\")   Wt 111.6 kg (246 lb)   SpO2 94%  Body mass index is 36.33 kg/m².    General: Alert, pleasant, well nourished, obese habitus, well developed male in NAD  HEENT: Normocephalic. Eyes conjunctiva clear lids without ptosis, pupils equal and reactive to light, ears normal shape and contour, canals are clear bilaterally, tympanic membranes with mild erythema, nasal mucosa pink and boggy, oropharynx is without erythema, edema or exudates.   Neck: Supple without bruit. Thyroid is not enlarged.  Pulmonary: Clear to ausculation.  Normal effort. No rales, ronchi, or wheezing.  Cardiovascular: Normal rate and rhythm without murmur. Carotid and radial pulses are intact and equal bilaterally.  No lower extremity edema.  Abdomen: Soft, nontender, nondistended. Normal bowel sounds. Liver and spleen are not palpable  Neurologic: Grossly nonfocal  Lymph: No cervical or supraclavicular lymph nodes are palpable  Skin: Warm and dry.    Musculoskeletal: Normal gait.   Psych: Normal mood and affect. Alert and oriented. Judgment and insight is normal.    Please note that this dictation was created using voice recognition software. I have made every reasonable attempt to correct " obvious errors, but I expect that there are errors of grammar and possibly content that I did not discover before finalizing the note.      Assessment/Plan  1. Low testosterone in male  Testosterone injection given today.  Will get updated testosterone levels in 6 months  - testosterone cypionate (DEPO-TESTOSTERONE) injection 100 mg; 0.5 mL by Intramuscular route Once.  - TESTOSTERONE F&T MALE ADULT; Future    2. Type 2 diabetes mellitus without complication, without long-term current use of insulin ([facility])  Continue with lifestyle measures.  Patient continues to work on weight loss.  His last quite a bit of weight and I have congratulated him on his success.  - HEMOGLOBIN A1C; Future  - Comp Metabolic Panel; Future  - TSH; Future  - FREE THYROXINE; Future  - MICROALBUMIN CREAT RATIO URINE; Future    3. Essential hypertension  Continue with metoprolol and lisinopril-hydrochlorothiazide.  Work on lifestyle measures.  ER precautions reviewed with patient.  Did encourage patient to monitor blood pressures at home.  - metoprolol SR (TOPROL XL) 100 MG TABLET SR 24 HR; Take 1 Tab by mouth every day.  Dispense: 30 Tab; Refill: 5  - lisinopril-hydrochlorothiazide (PRINZIDE, ZESTORETIC) 20-12.5 MG per tablet; Take 2 Tabs by mouth every day.  Dispense: 180 Tab; Refill: 1  - Lipid Profile; Future    4. Hypertriglyceridemia  Improved triglycerides.  Continue with lifestyle measures.  Advised on fish oil supplementation.  - Lipid Profile; Future    5. Obesity (BMI 35.0-39.9 without comorbidity) ([facility])  Working on weight loss.  Has Isidro lost over 30 pounds.    6. COPD suggested by initial evaluation ([facility])    Patient will return to clinic in 6 months or sooner if needed.

## 2019-05-31 NOTE — ASSESSMENT & PLAN NOTE
This is a chronic health problem that is well controlled with current medications and lifestyle measures.  Diagnosed by outside provider many years ago.  Patient quit smoking over 20 years ago.  Does not use inhalers.  Denies wheezing, cough, shortness of breath.

## 2019-05-31 NOTE — ASSESSMENT & PLAN NOTE
Chronic health problem, improving control with lifestyle measures.    Component      Latest Ref Rng & Units 12/27/2018 5/1/2019           7:05 AM  7:24 AM   Cholesterol,Tot      100 - 199 mg/dL 162 159   Triglycerides      0 - 149 mg/dL 393 (H) 246 (H)   HDL      >=40 mg/dL 31 (A) 35 (A)   LDL      <100 mg/dL 52 75

## 2019-05-31 NOTE — ASSESSMENT & PLAN NOTE
This is a chronic health problem that is well controlled with current medications and lifestyle measures.  Taking metoprolol  mg once daily and lisinopril-hydrochlorothiazide 20-12.5 mg 2 tabs daily.  Blood pressure readings at home average 120s/70s.  Blood pressure today is 132/74.  The patient denies chest pain, shortness of breath, headache, vision changes, epistaxis, or dyspnea on exertion.  Patient continues to work on weight loss and routine aerobic exercise.  Reviewed DASH diet.

## 2019-05-31 NOTE — ASSESSMENT & PLAN NOTE
Patient has lost 30 pounds in the last year and a half by working on diet and exercise.  He is continuing to wor at weight loss.

## 2019-11-12 ENCOUNTER — OFFICE VISIT (OUTPATIENT)
Dept: MEDICAL GROUP | Facility: PHYSICIAN GROUP | Age: 74
End: 2019-11-12
Payer: MEDICARE

## 2019-11-12 VITALS
BODY MASS INDEX: 38.06 KG/M2 | DIASTOLIC BLOOD PRESSURE: 82 MMHG | HEIGHT: 69 IN | TEMPERATURE: 98.4 F | SYSTOLIC BLOOD PRESSURE: 148 MMHG | RESPIRATION RATE: 14 BRPM | WEIGHT: 257 LBS | OXYGEN SATURATION: 93 % | HEART RATE: 62 BPM

## 2019-11-12 DIAGNOSIS — I10 ESSENTIAL HYPERTENSION: ICD-10-CM

## 2019-11-12 DIAGNOSIS — J44.9 COPD SUGGESTED BY INITIAL EVALUATION (HCC): ICD-10-CM

## 2019-11-12 DIAGNOSIS — R79.89 LOW TESTOSTERONE IN MALE: ICD-10-CM

## 2019-11-12 DIAGNOSIS — J22 LOWER RESPIRATORY INFECTION: ICD-10-CM

## 2019-11-12 DIAGNOSIS — R05.9 COUGH: ICD-10-CM

## 2019-11-12 PROBLEM — B96.89 BACTERIAL SINUSITIS: Status: RESOLVED | Noted: 2019-04-18 | Resolved: 2019-11-12

## 2019-11-12 PROBLEM — J32.9 BACTERIAL SINUSITIS: Status: RESOLVED | Noted: 2019-04-18 | Resolved: 2019-11-12

## 2019-11-12 PROCEDURE — 99214 OFFICE O/P EST MOD 30 MIN: CPT | Performed by: NURSE PRACTITIONER

## 2019-11-12 RX ORDER — ALBUTEROL SULFATE 90 UG/1
2 AEROSOL, METERED RESPIRATORY (INHALATION) EVERY 4 HOURS PRN
Qty: 1 INHALER | Refills: 1 | Status: SHIPPED
Start: 2019-11-12 | End: 2020-01-03

## 2019-11-12 RX ORDER — BENZONATATE 100 MG/1
100 CAPSULE ORAL 3 TIMES DAILY PRN
Qty: 60 CAP | Refills: 0 | Status: SHIPPED
Start: 2019-11-12 | End: 2020-01-03

## 2019-11-12 RX ORDER — AZITHROMYCIN 250 MG/1
TABLET, FILM COATED ORAL
Qty: 6 TAB | Refills: 0 | Status: SHIPPED | OUTPATIENT
Start: 2019-11-12 | End: 2019-12-10

## 2019-11-12 RX ORDER — METOPROLOL SUCCINATE 100 MG/1
100 TABLET, EXTENDED RELEASE ORAL DAILY
Qty: 90 TAB | Refills: 1 | Status: SHIPPED | OUTPATIENT
Start: 2019-11-12 | End: 2020-06-09

## 2019-11-12 RX ORDER — TESTOSTERONE CYPIONATE 200 MG/ML
100 INJECTION, SOLUTION INTRAMUSCULAR ONCE
Qty: 10 ML | Refills: 5 | Status: SHIPPED | OUTPATIENT
Start: 2019-11-12 | End: 2019-11-12

## 2019-11-12 RX ORDER — TESTOSTERONE CYPIONATE 200 MG/ML
200 INJECTION, SOLUTION INTRAMUSCULAR ONCE
COMMUNITY
End: 2019-11-12

## 2019-11-13 ENCOUNTER — TELEPHONE (OUTPATIENT)
Dept: URGENT CARE | Facility: PHYSICIAN GROUP | Age: 74
End: 2019-11-13

## 2019-11-13 NOTE — ASSESSMENT & PLAN NOTE
Chronic health problem.  Blood pressure is elevated today at appointment at 158/90.  Retaken 15 minutes later at 148/82.  Taking metoprolol and lisinopril-hydrochlorothiazide.  The patient denies chest pain, shortness of breath, headache, vision changes, epistaxis, or dyspnea on exertion.

## 2019-11-13 NOTE — ASSESSMENT & PLAN NOTE
Patient reports dry cough, onset 3 weeks ago.  Started with chills and sinus congestion/sore throat.  All symptoms have improved except for cough and fatigue.  Cough is worse at night, keeping him awake.  Denies paroxysmal of cough, inspiratory whoop, posttussive vomiting.  Has intermittent wheezing with chest tightness.  Delsym cough syrup in addition to cough drops and DayQuil.  Patient does have history of COPD, but does not use inhalers.

## 2019-11-13 NOTE — TELEPHONE ENCOUNTER
Rx for  Depo-testosterone was faxed to [facility] pharmacy on 11/12/2019  Fax conformation received

## 2019-11-13 NOTE — PROGRESS NOTES
CC: Cough, testosterone refill    HISTORY OF THE PRESENT ILLNESS: Patient is a 74 y.o. male. This pleasant patient is here today for evaluation and management of the following health problems.      Cough  Patient reports dry cough, onset 3 weeks ago.  Started with chills and sinus congestion/sore throat.  All symptoms have improved except for cough and fatigue.  Cough is worse at night, keeping him awake.  Denies paroxysmal of cough, inspiratory whoop, posttussive vomiting.  Has intermittent wheezing with chest tightness.  Delsym cough syrup in addition to cough drops and DayQuil.  Patient does have history of COPD, but does not use inhalers.    Essential hypertension  Chronic health problem.  Blood pressure is elevated today at appointment at 158/90.  Retaken 15 minutes later at 148/82.  Taking metoprolol and lisinopril-hydrochlorothiazide.  The patient denies chest pain, shortness of breath, headache, vision changes, epistaxis, or dyspnea on exertion.    Low testosterone in male  Chronic health problem.  Administer self testosterone injections once a month.  Reportedly ran out of medication approximately 6 weeks ago.  For some reason medication was not refilled for patient.  Since running out, he reports irritability and fatigue.  He is wanting to know if testosterone injections are really the treatment of choice.  Would like to know potential effects/risks if he can discontinue his testosterone.  Patient has never met with endocrinology.  He is open to referral for further evaluation and treatment recommendations.      Allergies: Patient has no known allergies.    Current Outpatient Medications Ordered in Epic   Medication Sig Dispense Refill   • testosterone cypionate (DEPO-TESTOSTERONE) 200 MG/ML Solution injection 0.5 mL by Intramuscular route Once for 1 dose. Administer 0.5mL IM every 30 days until gone. 10 mL 5   • azithromycin (ZITHROMAX) 250 MG Tab As per package directions. 6 Tab 0   • benzonatate  "(TESSALON) 100 MG Cap Take 1 Cap by mouth 3 times a day as needed for Cough. 60 Cap 0   • metoprolol SR (TOPROL XL) 100 MG TABLET SR 24 HR Take 1 Tab by mouth every day. 90 Tab 1   • albuterol 108 (90 Base) MCG/ACT Aero Soln inhalation aerosol Inhale 2 Puffs by mouth every four hours as needed for Shortness of Breath. 1 Inhaler 1   • lisinopril-hydrochlorothiazide (PRINZIDE, ZESTORETIC) 20-12.5 MG per tablet Take 2 Tabs by mouth every day. 180 Tab 1     No current Epic-ordered facility-administered medications on file.        Past Medical History:   Diagnosis Date   • CATARACT    • Dental disorder     full dentures   • Heart burn        Past Surgical History:   Procedure Laterality Date   • NASAL FLAP  4/14/2011    Performed by BUTCH MCLEAN at SURGERY SAME DAY [facility] ORS   • LUMBAR LAMINECTOMY DISKECTOMY  3/25/2009    Performed by RANULFO ASH at SURGERY [facility] ORS   • SHOULDER SURGERY      right rcr       Social History     Tobacco Use   • Smoking status: Former Smoker   • Smokeless tobacco: Never Used   • Tobacco comment: 1993   Substance Use Topics   • Alcohol use: No   • Drug use: No       No family history on file.    ROS:   As in HPI, otherwise negative for chest pain, dyspnea, abdominal pain, dysuria, blood in stool, fever           Exam: /82   Pulse 62   Temp 36.9 °C (98.4 °F) (Temporal)   Resp 14   Ht 1.753 m (5' 9\")   Wt 116.6 kg (257 lb)   SpO2 93%  Body mass index is 37.95 kg/m².    General: Alert, pleasant, well nourished, well developed male in NAD  HEENT: Normocephalic. Eyes conjunctiva clear lids without ptosis, pupils equal and reactive to light, ears normal shape and contour, canals are clear bilaterally, tympanic membranes are pearly gray with good light reflex, nasal mucosa without erythema and drainage, oropharynx is without erythema, edema or exudates.   Neck: Supple without bruit. Thyroid is not enlarged.  Pulmonary: Diffuse expiratory wheeze..  Normal effort. No " rales, ronchi, or wheezing.  Dry cough heard on exam.  Cardiovascular: Normal rate and rhythm without murmur. Carotid and radial pulses are intact and equal bilaterally.  No lower extremity edema.  Abdomen: Soft, nontender, nondistended.   Neurologic: Grossly nonfocal  Skin: Warm and dry.   Musculoskeletal: Normal gait.   Psych: Normal mood and affect. Alert and oriented. Judgment and insight is normal.    Please note that this dictation was created using voice recognition software. I have made every reasonable attempt to correct obvious errors, but I expect that there are errors of grammar and possibly content that I did not discover before finalizing the note.      Assessment/Plan  1. Low testosterone in male  Patient ran out of testosterone medication 6 weeks ago.  Now having symptoms of irritation and fatigue.  Prescription for testosterone faxed to pharmacy.  Patient having any questions on testosterone supplementation.  Has never consulted with endocrinology.  Will refer to endocrinology for further evaluation and recommendations.  - testosterone cypionate (DEPO-TESTOSTERONE) 200 MG/ML Solution injection; 0.5 mL by Intramuscular route Once for 1 dose. Administer 0.5mL IM every 30 days until gone.  Dispense: 10 mL; Refill: 5  - REFERRAL TO ENDOCRINOLOGY    2. Lower respiratory infection  Patient has had symptoms for over 3 weeks.  History of COPD.  Will prescribe azithromycin.  Instructions and side effects reviewed with patient.  Will prescribe Tessalon for cough.  Reviewed instructions and side effects with patient.  Prescription for albuterol inhaler sent to pharmacy.  Patient instructed to use this for chest tightness or cough.  Patient will get chest x-ray if cough does not improve.  ER precautions reviewed with patient.  - azithromycin (ZITHROMAX) 250 MG Tab; As per package directions.  Dispense: 6 Tab; Refill: 0  - benzonatate (TESSALON) 100 MG Cap; Take 1 Cap by mouth 3 times a day as needed for Cough.   Dispense: 60 Cap; Refill: 0    3. Cough    - DX-CHEST-2 VIEWS; Future  - albuterol 108 (90 Base) MCG/ACT Aero Soln inhalation aerosol; Inhale 2 Puffs by mouth every four hours as needed for Shortness of Breath.  Dispense: 1 Inhaler; Refill: 1    4. Essential hypertension  Elevated blood pressure today at appointment.  Asymptomatic.  Patient needing refill of metoprolol.  Prescription sent to [facility].  Continue to monitor.  ER precautions reviewed with patient.  - metoprolol SR (TOPROL XL) 100 MG TABLET SR 24 HR; Take 1 Tab by mouth every day.  Dispense: 90 Tab; Refill: 1    5. COPD suggested by initial evaluation ([facility])  She has history of COPD, diagnosed by outside provider many years ago.  Does not use routine inhalers.    Patient will return to clinic as previously scheduled in 3 weeks for lab review or sooner if needed.

## 2019-11-13 NOTE — ASSESSMENT & PLAN NOTE
Chronic health problem.  Administer self testosterone injections once a month.  Reportedly ran out of medication approximately 6 weeks ago.  For some reason medication was not refilled for patient.  Since running out, he reports irritability and fatigue.  He is wanting to know if testosterone injections are really the treatment of choice.  Would like to know potential effects/risks if he can discontinue his testosterone.  Patient has never met with endocrinology.  He is open to referral for further evaluation and treatment recommendations.

## 2019-12-04 ENCOUNTER — HOSPITAL ENCOUNTER (OUTPATIENT)
Dept: LAB | Facility: MEDICAL CENTER | Age: 74
End: 2019-12-04
Attending: NURSE PRACTITIONER
Payer: MEDICARE

## 2019-12-04 DIAGNOSIS — R79.89 LOW TESTOSTERONE IN MALE: ICD-10-CM

## 2019-12-04 DIAGNOSIS — E78.1 HYPERTRIGLYCERIDEMIA: ICD-10-CM

## 2019-12-04 DIAGNOSIS — E11.9 TYPE 2 DIABETES MELLITUS WITHOUT COMPLICATION, WITHOUT LONG-TERM CURRENT USE OF INSULIN (HCC): ICD-10-CM

## 2019-12-04 DIAGNOSIS — I10 ESSENTIAL HYPERTENSION: ICD-10-CM

## 2019-12-04 LAB
ALBUMIN SERPL BCP-MCNC: 4.4 G/DL (ref 3.2–4.9)
ALBUMIN/GLOB SERPL: 1.5 G/DL
ALP SERPL-CCNC: 49 U/L (ref 30–99)
ALT SERPL-CCNC: 71 U/L (ref 2–50)
ANION GAP SERPL CALC-SCNC: 10 MMOL/L (ref 0–11.9)
AST SERPL-CCNC: 42 U/L (ref 12–45)
BILIRUB SERPL-MCNC: 1.5 MG/DL (ref 0.1–1.5)
BUN SERPL-MCNC: 24 MG/DL (ref 8–22)
CALCIUM SERPL-MCNC: 9.6 MG/DL (ref 8.5–10.5)
CHLORIDE SERPL-SCNC: 103 MMOL/L (ref 96–112)
CHOLEST SERPL-MCNC: 179 MG/DL (ref 100–199)
CO2 SERPL-SCNC: 27 MMOL/L (ref 20–33)
CREAT SERPL-MCNC: 1.06 MG/DL (ref 0.5–1.4)
CREAT UR-MCNC: 178 MG/DL
GLOBULIN SER CALC-MCNC: 3 G/DL (ref 1.9–3.5)
GLUCOSE SERPL-MCNC: 133 MG/DL (ref 65–99)
HDLC SERPL-MCNC: 31 MG/DL
LDLC SERPL CALC-MCNC: ABNORMAL MG/DL
MICROALBUMIN UR-MCNC: 2.2 MG/DL
MICROALBUMIN/CREAT UR: 12 MG/G (ref 0–30)
POTASSIUM SERPL-SCNC: 4.3 MMOL/L (ref 3.6–5.5)
PROT SERPL-MCNC: 7.4 G/DL (ref 6–8.2)
SODIUM SERPL-SCNC: 140 MMOL/L (ref 135–145)
T4 FREE SERPL-MCNC: 0.7 NG/DL (ref 0.53–1.43)
TRIGL SERPL-MCNC: 516 MG/DL (ref 0–149)
TSH SERPL DL<=0.005 MIU/L-ACNC: 1.54 UIU/ML (ref 0.38–5.33)

## 2019-12-04 PROCEDURE — 84402 ASSAY OF FREE TESTOSTERONE: CPT

## 2019-12-04 PROCEDURE — 80061 LIPID PANEL: CPT

## 2019-12-04 PROCEDURE — 84270 ASSAY OF SEX HORMONE GLOBUL: CPT

## 2019-12-04 PROCEDURE — 83036 HEMOGLOBIN GLYCOSYLATED A1C: CPT | Mod: GA

## 2019-12-04 PROCEDURE — 82570 ASSAY OF URINE CREATININE: CPT

## 2019-12-04 PROCEDURE — 36415 COLL VENOUS BLD VENIPUNCTURE: CPT

## 2019-12-04 PROCEDURE — 84439 ASSAY OF FREE THYROXINE: CPT

## 2019-12-04 PROCEDURE — 82043 UR ALBUMIN QUANTITATIVE: CPT

## 2019-12-04 PROCEDURE — 80053 COMPREHEN METABOLIC PANEL: CPT

## 2019-12-04 PROCEDURE — 84403 ASSAY OF TOTAL TESTOSTERONE: CPT

## 2019-12-04 PROCEDURE — 84443 ASSAY THYROID STIM HORMONE: CPT

## 2019-12-05 LAB
EST. AVERAGE GLUCOSE BLD GHB EST-MCNC: 143 MG/DL
HBA1C MFR BLD: 6.6 % (ref 0–5.6)

## 2019-12-06 LAB
SHBG SERPL-SCNC: 48 NMOL/L (ref 11–80)
TESTOST FREE MFR SERPL: 1.4 % (ref 1.6–2.9)
TESTOST FREE SERPL-MCNC: 25 PG/ML (ref 47–244)
TESTOST SERPL-MCNC: 178 NG/DL (ref 300–720)

## 2019-12-10 ENCOUNTER — OFFICE VISIT (OUTPATIENT)
Dept: MEDICAL GROUP | Facility: PHYSICIAN GROUP | Age: 74
End: 2019-12-10
Payer: MEDICARE

## 2019-12-10 VITALS
TEMPERATURE: 97.6 F | OXYGEN SATURATION: 94 % | DIASTOLIC BLOOD PRESSURE: 72 MMHG | BODY MASS INDEX: 38.21 KG/M2 | WEIGHT: 258 LBS | SYSTOLIC BLOOD PRESSURE: 124 MMHG | RESPIRATION RATE: 14 BRPM | HEIGHT: 69 IN | HEART RATE: 76 BPM

## 2019-12-10 DIAGNOSIS — E11.9 TYPE 2 DIABETES MELLITUS WITHOUT COMPLICATION, WITHOUT LONG-TERM CURRENT USE OF INSULIN (HCC): ICD-10-CM

## 2019-12-10 DIAGNOSIS — E78.1 HYPERTRIGLYCERIDEMIA: ICD-10-CM

## 2019-12-10 DIAGNOSIS — G47.30 SLEEP-DISORDERED BREATHING: ICD-10-CM

## 2019-12-10 DIAGNOSIS — R79.89 LOW TESTOSTERONE IN MALE: ICD-10-CM

## 2019-12-10 PROBLEM — R05.9 COUGH: Status: RESOLVED | Noted: 2019-11-12 | Resolved: 2019-12-10

## 2019-12-10 PROCEDURE — 99214 OFFICE O/P EST MOD 30 MIN: CPT | Performed by: NURSE PRACTITIONER

## 2019-12-10 NOTE — PROGRESS NOTES
"CC: Lab review, hypertriglyceridemia, diabetes    HISTORY OF THE PRESENT ILLNESS: Patient is a 74 y.o. male. This pleasant patient is here today for evaluation and management of the following health problems.    Health Maintenance: Patient reports colonoscopy 5 years ago with removal of polyps.  Was told to come back in 5 years.  Patient declining referral at this time.  Did offer fit test, patient declined.      Sleep-disordered breathing  Patient denies having history of sleep study or recommendations for CPAP.  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  He never did have sleep study done.  Denies snoring.      Hypertriglyceridemia  Chronic health problem, worsening.  Glycerides are quite elevated.  Hemoglobin A1c is under good control with lifestyle measures.  Likely not related to blood glucose.  Does not drink alcohol.  The 10-year ASCVD risk score (Dallasisaura SPAIN Jr., et al., 2013) is: 43.7%.  I explained patient the risks of heart disease or stroke in the next 10 years and then a high intensity statin is advised.  Patient refuses to take any medication.  [facility] will [facility].  Patient reports \"I just want to take a lot of medicine.\"      Component      Latest Ref Rng & Units 12/27/2018 5/1/2019 12/4/2019   Cholesterol,Tot      100 - 199 mg/dL 162 159 179   Triglycerides      0 - 149 mg/dL 393 (H) 246 (H) 516 (H)   HDL      >=40 mg/dL 31 (A) 35 (A) 31 (A)   LDL      <100 mg/dL 52 75 see below         Type 2 diabetes mellitus without complication, without long-term current use of insulin (HCC)  This is a chronic health problem that is well controlled with current medications and lifestyle measures.  Patient has been working on lifestyle measures including low carbohydrate diet and routine aerobic exercise.  Hemoglobin A1c is 6.6%.  Denies vision changes, polyuria.  Patient reports diabetes runs in " "his family.  Though he does comment today that \"I do not think I really have diabetes because I don't have any symptoms.\"  On ACE.  Not on statin.  Has not had eye exam in over a year.  Declines retinal scan today.    Component      Latest Ref Rng & Units 12/27/2018 5/1/2019 12/4/2019   Glycohemoglobin      0.0 - 5.6 % 7.9 (H) 6.7 (H) 6.6 (H)       Low testosterone in male  Testosterone level is still decreased.  Patient reports he has taken 1 testosterone injection about 2 weeks ago, the only one in the last 2 or 3 months.  He was referred to endocrinology at last appointment.  He has not called to set up appointment as he is waiting to [facility] whom he is hoping to [facility] with in the next month.      Allergies: Patient has no known allergies.    Current Outpatient Medications Ordered in Epic   Medication Sig Dispense Refill   • benzonatate (TESSALON) 100 MG Cap Take 1 Cap by mouth 3 times a day as needed for Cough. 60 Cap 0   • metoprolol SR (TOPROL XL) 100 MG TABLET SR 24 HR Take 1 Tab by mouth every day. 90 Tab 1   • albuterol 108 (90 Base) MCG/ACT Aero Soln inhalation aerosol Inhale 2 Puffs by mouth every four hours as needed for Shortness of Breath. 1 Inhaler 1   • lisinopril-hydrochlorothiazide (PRINZIDE, ZESTORETIC) 20-12.5 MG per tablet Take 2 Tabs by mouth every day. 180 Tab 1     No current Epic-ordered facility-administered medications on file.        Past Medical History:   Diagnosis Date   • CATARACT    • Dental disorder     full dentures   • Heart burn        Past Surgical History:   Procedure Laterality Date   • NASAL FLAP  4/14/2011    Performed by BUTCH MCLEAN at SURGERY SAME DAY [facility]S   • LUMBAR LAMINECTOMY DISKECTOMY  3/25/2009    Performed by RANULFO ASH at SURGERY [facility] ORS   • SHOULDER SURGERY      right rcr       Social History     Tobacco Use   • Smoking status: Former Smoker   • Smokeless tobacco: Never Used   • Tobacco comment: 1993 " "  Substance Use Topics   • Alcohol use: No   • Drug use: No       History reviewed. No pertinent family history.    ROS:   As in HPI, otherwise negative for chest pain, dyspnea, abdominal pain, dysuria, blood in stool, fever           Exam: /72 (BP Location: Left arm, Patient Position: Sitting, BP Cuff Size: Adult)   Pulse 76   Temp 36.4 °C (97.6 °F)   Resp 14   Ht 1.753 m (5' 9\")   Wt 117 kg (258 lb)   SpO2 94%  Body mass index is 38.1 kg/m².    General: Alert, pleasant, obese habitus, well nourished, well developed male in NAD  Neck: Supple without bruit. Thyroid is not enlarged.  Pulmonary: Clear to ausculation.  Normal effort. No rales, ronchi, or wheezing.  Cardiovascular: Normal rate and rhythm without murmur. Carotid and radial pulses are intact and equal bilaterally.  No lower extremity edema.  Abdomen: Soft, nontender, nondistended.  Neurologic: Grossly nonfocal  Skin: Warm and dry.    Musculoskeletal: Normal gait.   Psych: Normal mood and affect. Alert and oriented. Judgment and insight is normal.    Please note that this dictation was created using voice recognition software. I have made every reasonable attempt to correct obvious errors, but I expect that there are errors of grammar and possibly content that I did not discover before finalizing the note.      Assessment/Plan  1. Sleep-disordered breathing  Patient denies history of sleep disordered breathing, sleep study, advise for CPAP.  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    2. Hypertriglyceridemia  Strongly advised patient of risks of stroke and heart disease because of elevated cholesterol levels.  Patient adamantly refuses to start any medication at this time though he does verbalize understanding of what I am saying; he just does not agree.  He would like to wait until he [facility] another [facility]. He is willing to start Fish Oil " supplementation.  ER precautions reviewed with patient.  - Lipid Profile; Future    3. Type 2 diabetes mellitus without complication, without long-term current use of insulin (HCC)  Explained patient that he can stop diabetes in the asymptomatic.  Handout given to patient today on diabetes.  He will continue to work on lifestyle measures.  - HEMOGLOBIN A1C; Future    4. Low testosterone in male  Continue with supplemental testosterone monthly.  [facility]    Patient will return to clinic in 3 months or sooner if needed.

## 2019-12-10 NOTE — ASSESSMENT & PLAN NOTE
Patient denies having history of sleep study or recommendations for CPAP.  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  He never did have sleep study done.  Denies snoring.

## 2019-12-10 NOTE — ASSESSMENT & PLAN NOTE
"Chronic health problem, worsening.  Glycerides are quite elevated.  Hemoglobin A1c is under good control with lifestyle measures.  Likely not related to blood glucose.  Does not drink alcohol.  The 10-year ASCVD risk score (Amy GRABIEL Jr., et al., 2013) is: 43.7%.  I explained patient the risks of heart disease or stroke in the next 10 years and then a high intensity statin is advised.  Patient refuses to take any medication.  [facility] and will see what they advise.  Patient reports \"I just want to take a lot of medicine.\"      Component      Latest Ref Rng & Units 12/27/2018 5/1/2019 12/4/2019   Cholesterol,Tot      100 - 199 mg/dL 162 159 179   Triglycerides      0 - 149 mg/dL 393 (H) 246 (H) 516 (H)   HDL      >=40 mg/dL 31 (A) 35 (A) 31 (A)   LDL      <100 mg/dL 52 75 see below       "

## 2019-12-10 NOTE — PATIENT INSTRUCTIONS
"Start Fish Oil 4000 mg a day      Food Choices to Lower Your Triglycerides  Triglycerides are a type of fat in your blood. High levels of triglycerides can increase the risk of heart disease and stroke. If your triglyceride levels are high, the foods you eat and your eating habits are very important. Choosing the right foods can help lower your triglycerides.   WHAT GENERAL GUIDELINES DO I NEED TO FOLLOW?  · Lose weight if you are overweight.    · Limit or avoid alcohol.    · Fill one half of your plate with vegetables and green salads.    · Limit fruit to two servings a day. Choose fruit instead of juice.    · Make one fourth of your plate whole grains. Look for the word \"whole\" as the first word in the ingredient list.  · Fill one fourth of your plate with lean protein foods.  · Enjoy fatty fish (such as salmon, mackerel, sardines, and tuna) three times a week.    · Choose healthy fats.    · Limit foods high in starch and sugar.  · Eat more home-cooked food and less restaurant, buffet, and fast food.  · Limit fried foods.  · Cook foods using methods other than frying.  · Limit saturated fats.  · Check ingredient lists to avoid foods with partially hydrogenated oils (trans fats) in them.  WHAT FOODS CAN I EAT?   Grains  Whole grains, such as whole wheat or whole grain breads, crackers, cereals, and pasta. Unsweetened oatmeal, bulgur, barley, quinoa, or brown rice. Corn or whole wheat flour tortillas.   Vegetables  Fresh or frozen vegetables (raw, steamed, roasted, or grilled). Green salads.  Fruits  All fresh, canned (in natural juice), or frozen fruits.  Meat and Other Protein Products  Ground beef (85% or leaner), grass-fed beef, or beef trimmed of fat. Skinless chicken or turkey. Ground chicken or turkey. Pork trimmed of fat. All fish and seafood. Eggs. Dried beans, peas, or lentils. Unsalted nuts or seeds. Unsalted canned or dry beans.  Dairy  Low-fat dairy products, such as skim or 1% milk, 2% or reduced-fat " cheeses, low-fat ricotta or cottage cheese, or plain low-fat yogurt.  Fats and Oils  Tub margarines without trans fats. Light or reduced-fat mayonnaise and salad dressings. Avocado. Safflower, olive, or canola oils. Natural peanut or almond butter.  The items listed above may not be a complete list of recommended foods or beverages. Contact your dietitian for more options.  WHAT FOODS ARE NOT RECOMMENDED?   Grains  White bread. White pasta. White rice. Cornbread. Bagels, pastries, and croissants. Crackers that contain trans fat.  Vegetables  White potatoes. Corn. Creamed or fried vegetables. Vegetables in a cheese sauce.  Fruits  Dried fruits. Canned fruit in light or heavy syrup. Fruit juice.  Meat and Other Protein Products  Fatty cuts of meat. Ribs, chicken wings, sorensen, sausage, bologna, salami, chitterlings, fatback, hot dogs, bratwurst, and packaged luncheon meats.  Dairy  Whole or 2% milk, cream, half-and-half, and cream cheese. Whole-fat or sweetened yogurt. Full-fat cheeses. Nondairy creamers and whipped toppings. Processed cheese, cheese spreads, or cheese curds.  Sweets and Desserts  Corn syrup, sugars, honey, and molasses. Candy. Jam and jelly. Syrup. Sweetened cereals. Cookies, pies, cakes, donuts, muffins, and ice cream.  Fats and Oils  Butter, stick margarine, lard, shortening, ghee, or sorensen fat. Coconut, palm kernel, or palm oils.  Beverages  Alcohol. Sweetened drinks (such as sodas, lemonade, and fruit drinks or punches).  The items listed above may not be a complete list of foods and beverages to avoid. Contact your dietitian for more information.     This information is not intended to replace advice given to you by your health care provider. Make sure you discuss any questions you have with your health care provider.     Document Released: 10/05/2005 Document Revised: 01/08/2016 Document Reviewed: 10/22/2014  [email or web address] Interactive Patient Education ©2016 [email or web address]

## 2019-12-11 NOTE — ASSESSMENT & PLAN NOTE
Testosterone level is still decreased.  Patient reports he has taken 1 testosterone injection about 2 weeks ago, the only one in the last 2 or 3 months.  He was referred to endocrinology at last appointment.  He has not called to set up appointment as he is waiting [facility] is [facility] to [facility] with in the next month.

## 2019-12-11 NOTE — ASSESSMENT & PLAN NOTE
"This is a chronic health problem that is well controlled with current medications and lifestyle measures.  Patient has been working on lifestyle measures including low carbohydrate diet and routine aerobic exercise.  Hemoglobin A1c is 6.6%.  Denies vision changes, polyuria.  Patient reports diabetes runs in his family.  Though he does comment today that \"I do not think I really have diabetes because I don't have any symptoms.\"  On ACE.  Not on statin.  Has not had eye exam in over a year.  Declines retinal scan today.    Component      Latest Ref Rng & Units 12/27/2018 5/1/2019 12/4/2019   Glycohemoglobin      0.0 - 5.6 % 7.9 (H) 6.7 (H) 6.6 (H)     "

## 2019-12-15 DIAGNOSIS — I10 ESSENTIAL HYPERTENSION: ICD-10-CM

## 2019-12-16 RX ORDER — LISINOPRIL AND HYDROCHLOROTHIAZIDE 20; 12.5 MG/1; MG/1
TABLET ORAL
Qty: 180 TAB | Refills: 1 | Status: SHIPPED | OUTPATIENT
Start: 2019-12-16 | End: 2020-06-16

## 2019-12-16 NOTE — TELEPHONE ENCOUNTER
Was the patient seen in the last year in this department? Yes    Does patient have an active prescription for medications requested? No     Received Request Via: Pharmacy     Last OV 12/10/19, last labs 12/4/19

## 2020-01-03 ENCOUNTER — OFFICE VISIT (OUTPATIENT)
Dept: URGENT CARE | Facility: PHYSICIAN GROUP | Age: 75
End: 2020-01-03
Payer: MEDICARE

## 2020-01-03 VITALS
HEART RATE: 76 BPM | RESPIRATION RATE: 16 BRPM | WEIGHT: 265 LBS | OXYGEN SATURATION: 93 % | HEIGHT: 69 IN | BODY MASS INDEX: 39.25 KG/M2 | TEMPERATURE: 97.8 F | SYSTOLIC BLOOD PRESSURE: 148 MMHG | DIASTOLIC BLOOD PRESSURE: 90 MMHG

## 2020-01-03 DIAGNOSIS — Z02.4 ENCOUNTER FOR DEPARTMENT OF TRANSPORTATION (DOT) EXAMINATION FOR DRIVING LICENSE RENEWAL: ICD-10-CM

## 2020-01-03 PROCEDURE — 99213 OFFICE O/P EST LOW 20 MIN: CPT | Performed by: PHYSICIAN ASSISTANT

## 2020-01-03 ASSESSMENT — ENCOUNTER SYMPTOMS
CARDIOVASCULAR NEGATIVE: 1
GASTROINTESTINAL NEGATIVE: 1
SHORTNESS OF BREATH: 0
FEVER: 0
VOMITING: 0
CONSTITUTIONAL NEGATIVE: 1
NAUSEA: 0
MUSCULOSKELETAL NEGATIVE: 1
PSYCHIATRIC NEGATIVE: 1
RESPIRATORY NEGATIVE: 1
NEUROLOGICAL NEGATIVE: 1
COUGH: 0
CHILLS: 0
CONSTIPATION: 0
ABDOMINAL PAIN: 0
EYES NEGATIVE: 1
DIARRHEA: 0

## 2020-01-03 NOTE — PROGRESS NOTES
"Subjective:   Estuardo Christian is a 74 y.o. male who presents for Employment Physical        HPI   Patient is here for a DOT physical and denies any issues at this time. All paperwork reviewed.     Review of Systems   Constitutional: Negative.  Negative for chills, fever and malaise/fatigue.   HENT: Negative.    Eyes: Negative.    Respiratory: Negative.  Negative for cough and shortness of breath.    Cardiovascular: Negative.    Gastrointestinal: Negative.  Negative for abdominal pain, constipation, diarrhea, nausea and vomiting.   Genitourinary: Negative.    Musculoskeletal: Negative.    Skin: Negative.    Neurological: Negative.    Endo/Heme/Allergies: Negative.    Psychiatric/Behavioral: Negative.    All other systems reviewed and are negative.      PMH:  has a past medical history of CATARACT, Dental disorder, and Heart burn.  MEDS:   Current Outpatient Medications:   •  lisinopril-hydrochlorothiazide (PRINZIDE) 20-12.5 MG per tablet, TAKE 2 TABLETS BY MOUTH ONCE DAILY, Disp: 180 Tab, Rfl: 1  •  metoprolol SR (TOPROL XL) 100 MG TABLET SR 24 HR, Take 1 Tab by mouth every day., Disp: 90 Tab, Rfl: 1  ALLERGIES: No Known Allergies  SURGHX:   Past Surgical History:   Procedure Laterality Date   • NASAL FLAP  4/14/2011    Performed by BUTCH MCLEAN at SURGERY SAME DAY [facility] ORS   • LUMBAR LAMINECTOMY DISKECTOMY  3/25/2009    Performed by RANULFO ASH at SURGERY [facility] ORS   • SHOULDER SURGERY      right rcr     SOCHX:  reports that he has quit smoking. He smoked 0.00 packs per day. He has never used smokeless tobacco. He reports that he does not drink alcohol or use drugs.  History reviewed. No pertinent family history.     Objective:   /90 (BP Location: Right arm, Patient Position: Sitting, BP Cuff Size: Large adult)   Pulse 76   Temp 36.6 °C (97.8 °F) (Temporal)   Resp 16   Ht 1.753 m (5' 9\")   Wt 120.2 kg (265 lb)   SpO2 93%   BMI 39.13 kg/m²     Physical Exam  Vitals signs reviewed. "   Constitutional:       General: He is not in acute distress.     Appearance: He is well-developed.   HENT:      Head: Normocephalic and atraumatic.      Right Ear: External ear normal.      Left Ear: External ear normal.      Nose: Nose normal.      Mouth/Throat:      Mouth: Mucous membranes are moist.   Eyes:      Conjunctiva/sclera: Conjunctivae normal.      Pupils: Pupils are equal, round, and reactive to light.   Neck:      Musculoskeletal: Normal range of motion and neck supple.      Trachea: No tracheal deviation.   Cardiovascular:      Rate and Rhythm: Normal rate and regular rhythm.   Pulmonary:      Effort: Pulmonary effort is normal.      Breath sounds: Normal breath sounds.   Musculoskeletal: Normal range of motion.   Skin:     General: Skin is warm and dry.      Capillary Refill: Capillary refill takes less than 2 seconds.   Neurological:      General: No focal deficit present.      Mental Status: He is alert and oriented to person, place, and time.   Psychiatric:         Mood and Affect: Mood normal.         Behavior: Behavior normal.       Repeat BP: 130/84        Assessment/Plan:     1. Encounter for Department of Transportation (DOT) examination for driving license renewal         Patient is here for a DOT physical and denies any issues at this time. All paperwork reviewed. Pt cleared for 1 year.     Please note that this dictation was created using voice recognition software. I have made every reasonable attempt to correct obvious errors, but I expect that there are errors of grammar and possibly content that I did not discover before finalizing the note.

## 2020-02-13 ENCOUNTER — OFFICE VISIT (OUTPATIENT)
Dept: MEDICAL GROUP | Facility: PHYSICIAN GROUP | Age: 75
End: 2020-02-13
Payer: MEDICARE

## 2020-02-13 VITALS
BODY MASS INDEX: 39.25 KG/M2 | DIASTOLIC BLOOD PRESSURE: 70 MMHG | SYSTOLIC BLOOD PRESSURE: 138 MMHG | OXYGEN SATURATION: 93 % | HEIGHT: 69 IN | WEIGHT: 265 LBS | RESPIRATION RATE: 14 BRPM | TEMPERATURE: 98 F | HEART RATE: 60 BPM

## 2020-02-13 DIAGNOSIS — B96.89 ACUTE BACTERIAL SINUSITIS: ICD-10-CM

## 2020-02-13 DIAGNOSIS — H66.90 ACUTE OTITIS MEDIA, UNSPECIFIED OTITIS MEDIA TYPE: ICD-10-CM

## 2020-02-13 DIAGNOSIS — I10 ESSENTIAL HYPERTENSION: ICD-10-CM

## 2020-02-13 DIAGNOSIS — E78.1 HYPERTRIGLYCERIDEMIA: ICD-10-CM

## 2020-02-13 DIAGNOSIS — R79.89 LOW TESTOSTERONE IN MALE: ICD-10-CM

## 2020-02-13 DIAGNOSIS — J01.90 ACUTE BACTERIAL SINUSITIS: ICD-10-CM

## 2020-02-13 DIAGNOSIS — E11.9 TYPE 2 DIABETES MELLITUS WITHOUT COMPLICATION, WITHOUT LONG-TERM CURRENT USE OF INSULIN (HCC): ICD-10-CM

## 2020-02-13 LAB
HBA1C MFR BLD: 7 % (ref 0–5.6)
INT CON NEG: NEGATIVE
INT CON POS: POSITIVE

## 2020-02-13 PROCEDURE — 83036 HEMOGLOBIN GLYCOSYLATED A1C: CPT | Performed by: NURSE PRACTITIONER

## 2020-02-13 PROCEDURE — 99214 OFFICE O/P EST MOD 30 MIN: CPT | Performed by: NURSE PRACTITIONER

## 2020-02-13 RX ORDER — TESTOSTERONE CYPIONATE 200 MG/ML
INJECTION, SOLUTION INTRAMUSCULAR
COMMUNITY
Start: 2019-12-10 | End: 2020-02-14

## 2020-02-13 RX ORDER — AMOXICILLIN AND CLAVULANATE POTASSIUM 875; 125 MG/1; MG/1
1 TABLET, FILM COATED ORAL 2 TIMES DAILY
Qty: 14 TAB | Refills: 0 | Status: SHIPPED
Start: 2020-02-13 | End: 2020-02-22

## 2020-02-13 ASSESSMENT — PATIENT HEALTH QUESTIONNAIRE - PHQ9: CLINICAL INTERPRETATION OF PHQ2 SCORE: 0

## 2020-02-13 NOTE — PATIENT INSTRUCTIONS
2400 Calorie Diet for Diabetes Meal Planning  The 2400 calorie diet is designed for eating up to 2400 calories each day. Following this diet and making healthy meal choices can help improve overall health. This diet controls blood sugar (glucose) levels and can also lower blood pressure and cholesterol.   SERVING SIZES  Measuring foods and serving sizes helps to make sure you are getting the right amount of food. The list below tells how big or small some common serving sizes are.  · 1 oz.........4 stacked dice.   · 3 oz.........Deck of cards.   · 1 tsp........Tip of little finger.   · 1 tbs........Thumb.   · 2 tbs........Golf ball.   · ½ cup.......Half of a fist.   · 1 cup........A fist.   GUIDELINES FOR CHOOSING FOODS  The goal of this diet is to eat a variety of foods and limit calories to 2400 each day. This can be done by choosing foods that are low in calories and in fat. The diet also suggests eating small amounts of food often. Doing this helps control your blood glucose levels so they do not get too high or low. Each meal or snack should contain a protein food source to help you feel more satisfied and to stabilize your blood glucose. Try to eat about the same amount of food around the same time each day. This includes weekend days, travel days, and days off work. Space your meals about 4 to 5 hours apart and add a snack between them if you wish.   For example, a daily food plan could include breakfast, a morning snack, lunch, dinner, and an evening snack. Healthy meals and snacks include whole grains, vegetables, fruits, lean meats, poultry, fish, and dairy products. As you plan your meals, choose a variety of foods. Choose from the bread and starches, vegetables, fruit, dairy, and meat/protein groups. Examples of foods from each group are listed below with their suggested serving sizes. Use measuring cups and spoons to become familiar with what a healthy portion looks like.  Bread and Starch  Each serving  equals 15 grams of carbohydrates.  · 1 slice bread.   · ¼ bagel.   · ¾ cup cold cereal (unsweetened).   · ½ cup hot cereal or mashed potatoes.   · 1 small potato (size of a computer mouse).   ·  cup cooked pasta or rice.   · ½ English muffin.   · 1 cup broth-based soup.   · 3 cups of popcorn.   · 4 to 6 whole-wheat crackers.   · ½ cup cooked beans, peas, or corn.   Vegetable  Each serving equals 5 grams of carbohydrates.  · ½ cup cooked vegetables.   · 1 cup raw vegetables.   · ½ cup tomato or vegetable juice.   Fruit  Each serving equals 15 grams of carbohydrates.  · 1 small apple or orange.   · 1¼ cup watermelon or strawberries.   · ½ cup applesauce (no sugar added).   · 2 tbs raisins.   · ½ banana.   · ½ cup canned fruit, packed in water, in its own juice, or sweetened with a sugar substitute.   · ½ cup unsweetened fruit juice.   Dairy  Each serving equals 12 to 15 grams of carbohydrates.  · 1 cup fat-free milk.   · 6 oz artificially sweetened yogurt or plain yogurt.   · 1 cup low-fat buttermilk.   · 1 cup soy milk.   Meat/Protein  · 1 large egg.   · 2 to 3 oz meat, poultry, or fish.   · ½ cup low-fat cottage cheese.   · 1 tbs peanut butter.   · ½ cup tofu.   · 1 oz low-fat cheese.   · ¼ cup canned tuna in water.   Fat  · 1 tsp oil.   · 1 tsp trans-fat-free margarine.   · 1 tsp butter.   · 1 tsp mayonnaise.   · 2 tbs avocado.   SAMPLE 2400 CALORIE DIET PLAN  Breakfast  · 1 English muffin (2 carb servings).   · 1 scrambled egg.   · 1 tsp margarine.   · 1 cup fat-free milk (1 carb serving).   · 1 large orange (2 carb servings).   Morning Snack  · ¼ cup low-fat cottage cheese.   · ½ cup canned peaches in juice (1 carb serving).   · 1 cup carrot sticks.   Lunch  · Grilled chicken salad.   · 2 oz chicken breast.   · 1 cup esthela lettuce or spinach.   · ½ cup diced tomato.   · ½ cup shredded carrots.   · ¼ cup sliced cucumbers.   · 2 tbs low-fat salad dressing.   · 2 slices whole-wheat bread (2 carb servings).   · 1  small apple (1 carb serving).   · 1 cup fat-free milk (1 carb serving).   · 15 baked chips ( 1 carb serving).   Afternoon Snack  · 8 reduced fat crackers (2 carb servings).   · 2 tbs peanut butter.   Dinner  · 3 oz salmon, broiled.   · 4½ small red potatoes, roasted with 1 tsp olive oil and seasoning (3 carb servings).   · 1 cup green beans.   · 1¼ cup strawberries (1 carb serving).   · 1 cup fat-free milk (1 carb serving).   Evening Snack  · 6 cups air-popped popcorn (2 carb servings).   · 2 tbs parmesan cheese sprinkled on top.   · 8 almonds.   MEAL PLAN  Use this worksheet to help you make a daily meal plan based on the 2400 calorie diet suggestions. The total amount of carbohydrates in your meal or snack is more important than making sure you include all of the food groups at every meal or snack. If you are using this plan to help you control your blood glucose, you may interchange carbohydrate-containing foods (dairy, starches, and fruits). Choose a variety of fresh foods of varying colors and flavors. You can choose from the following foods to build your day's meals:  · 12 Starches.   · 4 Vegetables.   · 4 Fruits.   · 3 Dairy.   · 7 oz Meat/Protein.   · Up to 8 Fats.   Your dietician can use this worksheet to help you decide how many servings and what types of foods are right for you.  BREAKFAST  Food Group and Servings / Food Choice  Starch ____________________________________________________  Dairy _____________________________________________________  Fruit _____________________________________________________  Meat/Protein ______________________________________________  Fat________________________________________________________  LUNCH  Food Group and Servings / Food Choice   Starch ___________________________________________________  Meat/Protein _____________________________________________  Vegetable ________________________________________________  Fruit  _____________________________________________________  Dairy ____________________________________________________  Fat_______________________________________________________  AFTERNOON SNACK  Food Group and Servings / Food Choice  Starch ___________________________________________________  Meat/Protein _____________________________________________  DINNER  Food Group and Servings / Food Choice  Starch ___________________________________________________  Meat/Protein _____________________________________________  Dairy ____________________________________________________  Vegetable ________________________________________________  Fruit _____________________________________________________  Fat_______________________________________________________  EVENING SNACK  Food Group and Servings / Food Choice  Fruit _____________________________________________________  Meat/Protein ______________________________________________  Starch ____________________________________________________  DAILY TOTALS  Starch __________________________  Vegetable _______________________  Fruits ___________________________  Dairy ___________________________  Meat/Protein ____________________  Fat _____________________________  Document Released: 07/10/2006 Document Revised: 03/11/2013 Document Reviewed: 11/02/2012  ExitCare® Patient Information ©2013 [facility].      Start Fish oil 4000 mg a day for cholesterol    Use albuterol inhaler for wheeze    Have fasting labs done before next appointment

## 2020-02-13 NOTE — PROGRESS NOTES
CC: Diabetes, ear fullness    HISTORY OF THE PRESENT ILLNESS: Patient is a 74 y.o. male. This pleasant patient is here today for evaluation and management of the following health problems.      Type 2 diabetes mellitus without complication, without long-term current use of insulin (HCC)  Chronic health problem, uncontrolled with current lifestyle measures.  Patient not taking any medication for diabetes.  He actually reports today that he does not think he has diabetes as he does not feel any symptoms.  I again tried to explain to patient that he can be asymptomatic, but have diabetes.  The goal is to prevent diabetes from worsening and becoming symptomatic.  Patient not convinced.  He does admit he has not been working on lifestyle measures.  On ACE, not on statin.  Component      Latest Ref Rng & Units 12/4/2019 2/13/2020           7:36 AM  9:21 AM   Glycohemoglobin      0.0 - 5.6 % 6.6 (H) 7.0 (A)       Low testosterone in male  Chronic problem for several years.  Diagnosed by outside provider.  Patient had been administering IM testosterone twice a month.  [facility] and has discontinued testosterone injections as he was not feeling any different with them.  He reports since stopping testosterone injections over a month ago, he has not felt any different.  Patient is uncertain if he will [facility].    Essential hypertension  This is a chronic health problem that is well controlled with current medications and lifestyle measures.  Taking lisinopril-hydrochlorothiazide 20-12.5 mg daily and metoprolol  mg daily.  Tolerating medication, denies lightheadedness or cough.  The patient denies chest pain, shortness of breath, headache, vision changes, epistaxis, or dyspnea on exertion.    Acute bacterial sinusitis  Patient reports ear fullness, sinus congestion, headache, cough, fatigue.  Onset 10 days ago.       Hypertriglyceridemia  Chronic health problem.  Discussed in depth with patient at last appointment.  [facility] was [facility]  He reports he forgot to talk about it with the provider.  His ASCVD risk is quite elevated at 43%.  I had advised patient to at least start taking fish oil as he did not want to start a statin.  Patient reports he is not been taking fish oil supplementation.  Has been taking flaxseed oil supplementation.  Will get updated labs prior to next appointment.      Allergies: Patient has no known allergies.    Current Outpatient Medications Ordered in Epic   Medication Sig Dispense Refill   • amoxicillin-clavulanate (AUGMENTIN) 875-125 MG Tab Take 1 Tab by mouth 2 times a day. 14 Tab 0   • lisinopril-hydrochlorothiazide (PRINZIDE) 20-12.5 MG per tablet TAKE 2 TABLETS BY MOUTH ONCE DAILY 180 Tab 1   • metoprolol SR (TOPROL XL) 100 MG TABLET SR 24 HR Take 1 Tab by mouth every day. 90 Tab 1   • testosterone cypionate (DEPO-TESTOSTERONE) 200 MG/ML Solution injection INJECT 0.5 ML INTRAMUSCULARLY EVERY 30 DAYS. DISCARD UNUSED PORTION       No current Epic-ordered facility-administered medications on file.        Past Medical History:   Diagnosis Date   • CATARACT    • Dental disorder     full dentures   • Heart burn        Past Surgical History:   Procedure Laterality Date   • NASAL FLAP  4/14/2011    Performed by BUTCH MCLEAN at SURGERY SAME DAY [facility] ORS   • LUMBAR LAMINECTOMY DISKECTOMY  3/25/2009    Performed by RANULFO ASH at SURGERY [facility] ORS   • SHOULDER SURGERY      right rcr       Social History     Tobacco Use   • Smoking status: Former Smoker     Packs/day: 0.00   • Smokeless tobacco: Never Used   • Tobacco comment: 1993   Substance Use Topics   • Alcohol use: No   • Drug use: No       History reviewed. No pertinent family history.    ROS:   As in HPI, otherwise negative for chest pain, dyspnea, abdominal pain, dysuria, blood in  "stool, fever          Exam: /70 (BP Location: Right arm, Patient Position: Sitting, BP Cuff Size: Adult)   Pulse 60   Temp 36.7 °C (98 °F)   Resp 14   Ht 1.753 m (5' 9\")   Wt 120.2 kg (265 lb)   SpO2 93%  Body mass index is 39.13 kg/m².    General: Alert, pleasant, obese habitus, well nourished, well developed male in NAD  HEENT: Normocephalic. Eyes conjunctiva clear lids without ptosis, pupils equal and reactive to light, ears normal shape and contour, canals are clear bilaterally, left tympanic membrane with erythema and bulging.  Right tympanic membrane is pearly gray with good light reflex, nasal mucosa without erythema and drainage, oropharynx is without erythema, edema or exudates.   Neck: Supple without bruit. Thyroid is not enlarged.  Pulmonary: Diffuse expiratory wheeze.  Normal effort. No rales, ronchi, or wheezing.  Cardiovascular: Normal rate and rhythm without murmur. Carotid and radial pulses are intact and equal bilaterally.  No lower extremity edema.  Abdomen: Soft, nontender, distended from obese habitus. Normal bowel sounds. Liver and spleen are not palpable  Neurologic: Grossly nonfocal  Lymph: No cervical or supraclavicular lymph nodes are palpable  Skin: Warm and dry.    Musculoskeletal: Normal gait.   Psych: Normal mood and affect. Alert and oriented. Judgment and insight is normal.    Please note that this dictation was created using voice recognition software. I have made every reasonable attempt to correct obvious errors, but I expect that there are errors of grammar and possibly content that I did not discover before finalizing the note.      Assessment/Plan  1. Type 2 diabetes mellitus without complication, without long-term current use of insulin ([facility])  Advised patient that starting on metformin would help control blood sugar levels.  Patient declines at this time.  Again reviewed lifestyle measures including weight loss, routine aerobic exercise as tolerated, low carbohydrate " diet.  Diabetic diet handout was given to patient today.  He agrees to work on lifestyle measures.  - Comp Metabolic Panel; Future  - HEMOGLOBIN A1C; Future  - ESTIMATED GFR; Future  - POCT Hemoglobin A1C    2. Hypertriglyceridemia  High ASCVD risk.  Refusing to start statin.  Again reviewed lifestyle measures.  Patient will start fish oil supplementation 4 g daily.  Will get updated lipid profile prior to next appointment.  - Lipid Profile; Future    3. Acute otitis media, unspecified otitis media type  Left tympanic membrane with erythema and bulging.  Also has signs infection.  Will treat with Augmentin.  Comfort measures reviewed with patient.  - amoxicillin-clavulanate (AUGMENTIN) 875-125 MG Tab; Take 1 Tab by mouth 2 times a day.  Dispense: 14 Tab; Refill: 0    4. Acute bacterial sinusitis  Ongoing for 10 days.  Will treat with Augmentin.  Instructions and side effects reviewed with patient.  Will return to clinic if symptoms do not improve or if they worsen in the next 48 to 72 hours.  Patient also has diffuse expiratory wheeze.  He has albuterol inhaler at home and will start using.  - amoxicillin-clavulanate (AUGMENTIN) 875-125 MG Tab; Take 1 Tab by mouth 2 times a day.  Dispense: 14 Tab; Refill: 0    5. Low testosterone in male  Patient no longer taking testosterone.  Doing well.    6. Essential hypertension  Continue with medications, no changes.  Did advise on lifestyle measures.  ER precautions reviewed with patient.    Patient will return to clinic in 3 months for lab review, diabetes, dyslipidemia or sooner if needed.

## 2020-02-14 PROBLEM — J01.90 ACUTE BACTERIAL SINUSITIS: Status: ACTIVE | Noted: 2020-02-14

## 2020-02-14 PROBLEM — B96.89 ACUTE BACTERIAL SINUSITIS: Status: ACTIVE | Noted: 2020-02-14

## 2020-02-14 NOTE — ASSESSMENT & PLAN NOTE
Chronic health problem.  Discussed in depth with patient at last appointment.  [facility] was [facility]  He reports he forgot to talk about it with the provider.  His ASCVD risk is quite elevated at 43%.  I had advised patient to at least start taking fish oil as he did not want to start a statin.  Patient reports he is not been taking fish oil supplementation.  Has been taking flaxseed oil supplementation.  Will get updated labs prior to next appointment.

## 2020-02-14 NOTE — ASSESSMENT & PLAN NOTE
Chronic health problem, uncontrolled with current lifestyle measures.  Patient not taking any medication for diabetes.  He actually reports today that he does not think he has diabetes as he does not feel any symptoms.  I again tried to explain to patient that he can be asymptomatic, but have diabetes.  The goal is to prevent diabetes from worsening and becoming symptomatic.  Patient not convinced.  He does admit he has not been working on lifestyle measures.  On ACE, not on statin.  Component      Latest Ref Rng & Units 12/4/2019 2/13/2020           7:36 AM  9:21 AM   Glycohemoglobin      0.0 - 5.6 % 6.6 (H) 7.0 (A)

## 2020-02-14 NOTE — ASSESSMENT & PLAN NOTE
This is a chronic health problem that is well controlled with current medications and lifestyle measures.  Taking lisinopril-hydrochlorothiazide 20-12.5 mg daily and metoprolol  mg daily.  Tolerating medication, denies lightheadedness or cough.  The patient denies chest pain, shortness of breath, headache, vision changes, epistaxis, or dyspnea on exertion.

## 2020-02-14 NOTE — ASSESSMENT & PLAN NOTE
Chronic problem for several years.  Diagnosed by outside provider.  Patient had been administering IM testosterone twice a month.  [facility] and has discontinued testosterone injections as he was not feeling any different with them.  He reports since stopping testosterone injections over a month ago, he has not felt any different.  Patient is uncertain if he will [facility].

## 2020-02-22 ENCOUNTER — OFFICE VISIT (OUTPATIENT)
Dept: URGENT CARE | Facility: PHYSICIAN GROUP | Age: 75
End: 2020-02-22
Payer: MEDICARE

## 2020-02-22 VITALS
WEIGHT: 260.5 LBS | TEMPERATURE: 96.6 F | HEART RATE: 95 BPM | OXYGEN SATURATION: 94 % | HEIGHT: 69 IN | RESPIRATION RATE: 20 BRPM | SYSTOLIC BLOOD PRESSURE: 140 MMHG | DIASTOLIC BLOOD PRESSURE: 80 MMHG | BODY MASS INDEX: 38.58 KG/M2

## 2020-02-22 DIAGNOSIS — J01.90 ACUTE BACTERIAL SINUSITIS: ICD-10-CM

## 2020-02-22 DIAGNOSIS — J22 ACUTE LOWER RESPIRATORY INFECTION: ICD-10-CM

## 2020-02-22 DIAGNOSIS — B96.89 ACUTE BACTERIAL SINUSITIS: ICD-10-CM

## 2020-02-22 PROCEDURE — 99214 OFFICE O/P EST MOD 30 MIN: CPT | Performed by: FAMILY MEDICINE

## 2020-02-22 RX ORDER — PREDNISONE 20 MG/1
TABLET ORAL
Qty: 5 TAB | Refills: 0 | Status: SHIPPED | OUTPATIENT
Start: 2020-02-22

## 2020-02-22 RX ORDER — AZITHROMYCIN 250 MG/1
TABLET, FILM COATED ORAL
Qty: 6 TAB | Refills: 0 | Status: SHIPPED | OUTPATIENT
Start: 2020-02-22

## 2020-02-22 NOTE — PROGRESS NOTES
Chief Complaint:    Chief Complaint   Patient presents with   • Cough     Pt reports productive (brown) cough for the last 6 days, right ear plugged. Pt sts he was seen by his provider for this last week and was on antibiotics and was feeling better and has finished his antibiotics and the cough is back.        History of Present Illness:    He was prescribed Augmentin 875 mg BID x 7 days on 2/13/2020 for sinus infection. He reports he improved with the antibiotic, but never fully got better, and when he finished the antibiotic, he got worse. He still has stuffy head, stuffy ears, and cough productive of purulent mucus. Z-noah has worked/tolerated for similar previous problem.      Review of Systems:    Constitutional: Negative for fever, chills, and diaphoresis.   Eyes: Negative for change in vision, photophobia, pain, redness, and discharge.  ENT: See HPI.   Respiratory: See HPI.  Cardiovascular: Negative for chest pain, palpitations, orthopnea, claudication, leg swelling, and PND.   Gastrointestinal: Negative for abdominal pain, nausea, vomiting, diarrhea, constipation, blood in stool, and melena.   Genitourinary: Negative for dysuria, urinary urgency, urinary frequency, hematuria, and flank pain.   Musculoskeletal: Negative for myalgias, joint pain, neck pain, and back pain.   Skin: Negative for rash and itching.   Neurological: Negative for dizziness, tingling, tremors, sensory change, speech change, focal weakness, seizures, loss of consciousness, and headaches.   Endo: Diabetic.  Heme: Does not bruise/bleed easily.   Psychiatric/Behavioral: Negative for depression, suicidal ideas, hallucinations, memory loss and substance abuse. The patient is not nervous/anxious and does not have insomnia.        Past Medical History:    Past Medical History:   Diagnosis Date   • CATARACT    • Dental disorder     full dentures   • Heart burn      Past Surgical History:    Past Surgical History:   Procedure Laterality Date   •  NASAL FLAP  2011    Performed by BUTCH MCLEAN at [facility]   • LUMBAR LAMINECTOMY DISKECTOMY  3/25/2009    [facility]SH [facility]   • SHOULDER SURGERY      right rcr     Social History:    Social History     Socioeconomic History   • Marital status:      Spouse name: Not on file   • Number of children: Not on file   • Years of education: Not on file   • Highest education level: Not on file   Occupational History   • Not on file   Social Needs   • Financial resource strain: Not on file   • Food insecurity     Worry: Not on file     Inability: Not on file   • Transportation needs     Medical: Not on file     Non-medical: Not on file   Tobacco Use   • Smoking status: Former Smoker     Packs/day: 0.00     Last attempt to quit:      Years since quittin.1   • Smokeless tobacco: Never Used   Substance and Sexual Activity   • Alcohol use: No   • Drug use: No   • Sexual activity: Not on file   Lifestyle   • Physical activity     Days per week: Not on file     Minutes per session: Not on file   • Stress: Not on file   Relationships   • Social connections     Talks on phone: Not on file     Gets together: Not on file     Attends [employer] service: Not on file     Active member of club or organization: Not on file     Attends meetings of clubs or organizations: Not on file     Relationship status: Not on file   • Intimate partner violence     Fear of current or ex partner: Not on file     Emotionally abused: Not on file     Physically abused: Not on file     Forced sexual activity: Not on file   Other Topics Concern   • Not on file   Social History Narrative   • Not on file     Family History:    History reviewed. No pertinent family history.    Medications:    Current Outpatient Medications on File Prior to Visit   Medication Sig Dispense Refill   • lisinopril-hydrochlorothiazide (PRINZIDE) 20-12.5 MG per tablet TAKE 2 TABLETS BY MOUTH ONCE DAILY  "180 Tab 1   • metoprolol SR (TOPROL XL) 100 MG TABLET SR 24 HR Take 1 Tab by mouth every day. 90 Tab 1     No current facility-administered medications on file prior to visit.      Allergies:    No Known Allergies      Vitals:    Vitals:    02/22/20 0926   BP: 140/80   BP Location: Right arm   Patient Position: Sitting   BP Cuff Size: Large adult   Pulse: 95   Resp: 20   Temp: 35.9 °C (96.6 °F)   TempSrc: Temporal   SpO2: 94%   Weight: 118.2 kg (260 lb 8 oz)   Height: 1.753 m (5' 9\")       Physical Exam:    Constitutional: Vital signs reviewed. Appears well-developed and well-nourished. Occl cough. No acute distress.   Eyes: Sclera white, conjunctivae clear.   ENT: External ears normal. External auditory canals normal without discharge. TMs translucent and non-bulging. Hearing normal. Nasal mucosa pink. Lips are normal. Oral mucosa pink and moist. Posterior pharynx: WNL.  Neck: Neck supple.   Cardiovascular: Regular rate and rhythm. No murmur.  Pulmonary/Chest: Respirations non-labored. Clear to auscultation bilaterally.  Lymph: Cervical nodes without tenderness or enlargement.  Musculoskeletal: Normal gait. Normal range of motion. No muscular atrophy or weakness.  Neurological: Alert and oriented to person, place, and time. Muscle tone normal. Coordination normal.   Skin: No rashes or lesions. Warm, dry, normal turgor.  Psychiatric: Normal mood and affect. Behavior is normal. Judgment and thought content normal.       Assessment / Plan:    1. Acute lower respiratory infection  - azithromycin (ZITHROMAX) 250 MG Tab; 2 TABS BY MOUTH ON DAY 1, 1 TAB ON DAYS 2-5.  Dispense: 6 Tab; Refill: 0    2. Acute bacterial sinusitis  - azithromycin (ZITHROMAX) 250 MG Tab; 2 TABS BY MOUTH ON DAY 1, 1 TAB ON DAYS 2-5.  Dispense: 6 Tab; Refill: 0  - predniSONE (DELTASONE) 20 MG Tab; 1 TAB BY MOUTH ONCE A DAY ONLY IF NEEDED FOR STUFFY HEAD, STUFFY NOSE, AND/OR STUFFY EARS. TAKE WITH FOOD.  Dispense: 5 Tab; Refill: 0      Discussed " with him DDX, management options, and risks, benefits, and alternatives to treatment plan agreed upon.    Agreeable to medications prescribed.    Discussed expected course of duration, time for improvement, and to seek follow-up in Emergency Room, urgent care, or with PCP if getting worse at any time or not improving within expected time frame.

## 2020-06-16 DIAGNOSIS — I10 ESSENTIAL HYPERTENSION: ICD-10-CM

## 2020-06-16 RX ORDER — LISINOPRIL AND HYDROCHLOROTHIAZIDE 20; 12.5 MG/1; MG/1
TABLET ORAL
Qty: 180 TAB | Refills: 0 | Status: SHIPPED | OUTPATIENT
Start: 2020-06-16 | End: 2020-09-10

## 2020-09-10 DIAGNOSIS — I10 ESSENTIAL HYPERTENSION: ICD-10-CM

## 2020-09-10 RX ORDER — LISINOPRIL AND HYDROCHLOROTHIAZIDE 20; 12.5 MG/1; MG/1
TABLET ORAL
Qty: 180 TAB | Refills: 0 | Status: SHIPPED | OUTPATIENT
Start: 2020-09-10 | End: 2020-10-16 | Stop reason: SDUPTHER

## 2020-10-16 DIAGNOSIS — I10 ESSENTIAL HYPERTENSION: ICD-10-CM

## 2020-10-16 RX ORDER — LISINOPRIL AND HYDROCHLOROTHIAZIDE 20; 12.5 MG/1; MG/1
TABLET ORAL
Qty: 180 TAB | Refills: 0 | Status: SHIPPED | OUTPATIENT
Start: 2020-10-16

## 2020-10-16 RX ORDER — METOPROLOL SUCCINATE 100 MG/1
TABLET, EXTENDED RELEASE ORAL
Qty: 90 TAB | Refills: 0 | Status: SHIPPED | OUTPATIENT
Start: 2020-10-16

## 2020-10-16 RX ORDER — LISINOPRIL AND HYDROCHLOROTHIAZIDE 20; 12.5 MG/1; MG/1
TABLET ORAL
Qty: 180 TAB | Refills: 0 | OUTPATIENT
Start: 2020-10-16

## 2020-10-16 NOTE — TELEPHONE ENCOUNTER
Last seen by PCP 02/13/2020. Last Blood Pressure reading was 140/80 on 2/22/2020. Pt is >62 y o   3 months given 1 month ago.

## 2020-10-17 NOTE — TELEPHONE ENCOUNTER
Refills for 90-day supply sent to pharmacy.  Please schedule patient for follow-up appointment.  Please remind patient to have labs done prior to appointment.  No more refills until lab work done.

## 2021-01-01 ENCOUNTER — APPOINTMENT (OUTPATIENT)
Dept: RADIOLOGY | Facility: MEDICAL CENTER | Age: 76
DRG: 208 | End: 2021-01-01
Attending: EMERGENCY MEDICINE
Payer: MEDICARE

## 2021-01-01 ENCOUNTER — APPOINTMENT (OUTPATIENT)
Dept: CARDIOLOGY | Facility: MEDICAL CENTER | Age: 76
DRG: 208 | End: 2021-01-01
Attending: INTERNAL MEDICINE
Payer: MEDICARE

## 2021-01-01 ENCOUNTER — APPOINTMENT (OUTPATIENT)
Dept: RADIOLOGY | Facility: MEDICAL CENTER | Age: 76
DRG: 208 | End: 2021-01-01
Attending: INTERNAL MEDICINE
Payer: MEDICARE

## 2021-01-01 ENCOUNTER — HOSPITAL ENCOUNTER (INPATIENT)
Facility: MEDICAL CENTER | Age: 76
LOS: 4 days | DRG: 208 | End: 2021-10-17
Attending: INTERNAL MEDICINE | Admitting: INTERNAL MEDICINE
Payer: MEDICARE

## 2021-01-01 VITALS
HEIGHT: 70 IN | BODY MASS INDEX: 36.33 KG/M2 | SYSTOLIC BLOOD PRESSURE: 164 MMHG | DIASTOLIC BLOOD PRESSURE: 71 MMHG | OXYGEN SATURATION: 53 % | WEIGHT: 253.75 LBS

## 2021-01-01 DIAGNOSIS — I95.9 HYPOTENSION, UNSPECIFIED HYPOTENSION TYPE: Primary | ICD-10-CM

## 2021-01-01 LAB
ALBUMIN SERPL BCP-MCNC: 2.5 G/DL (ref 3.2–4.9)
ALBUMIN SERPL BCP-MCNC: 2.8 G/DL (ref 3.2–4.9)
ALBUMIN/GLOB SERPL: 0.8 G/DL
ALBUMIN/GLOB SERPL: 0.8 G/DL
ALP SERPL-CCNC: 49 U/L (ref 30–99)
ALP SERPL-CCNC: 55 U/L (ref 30–99)
ALT SERPL-CCNC: 25 U/L (ref 2–50)
ALT SERPL-CCNC: 32 U/L (ref 2–50)
ANION GAP SERPL CALC-SCNC: 16 MMOL/L (ref 7–16)
ANION GAP SERPL CALC-SCNC: 18 MMOL/L (ref 7–16)
ANION GAP SERPL CALC-SCNC: 20 MMOL/L (ref 7–16)
APPEARANCE UR: ABNORMAL
AST SERPL-CCNC: 36 U/L (ref 12–45)
AST SERPL-CCNC: 51 U/L (ref 12–45)
BACTERIA #/AREA URNS HPF: ABNORMAL /HPF
BACTERIA SPEC RESP CULT: NORMAL
BASE EXCESS BLDA CALC-SCNC: -4 MMOL/L (ref -4–3)
BASE EXCESS BLDA CALC-SCNC: -5 MMOL/L (ref -4–3)
BASE EXCESS BLDA CALC-SCNC: -7 MMOL/L (ref -4–3)
BASOPHILS # BLD AUTO: 0.1 % (ref 0–1.8)
BASOPHILS # BLD AUTO: 0.1 % (ref 0–1.8)
BASOPHILS # BLD: 0.01 K/UL (ref 0–0.12)
BASOPHILS # BLD: 0.01 K/UL (ref 0–0.12)
BILIRUB SERPL-MCNC: 0.4 MG/DL (ref 0.1–1.5)
BILIRUB SERPL-MCNC: 0.5 MG/DL (ref 0.1–1.5)
BILIRUB UR QL STRIP.AUTO: NEGATIVE
BODY TEMPERATURE: ABNORMAL DEGREES
BREATHS SETTING VENT: 24
BREATHS SETTING VENT: 28
BREATHS SETTING VENT: 32
BREATHS SETTING VENT: 32
BREATHS SETTING VENT: 34
BUN SERPL-MCNC: 118 MG/DL (ref 8–22)
BUN SERPL-MCNC: 135 MG/DL (ref 8–22)
BUN SERPL-MCNC: 66 MG/DL (ref 8–22)
BUN SERPL-MCNC: 72 MG/DL (ref 8–22)
BUN SERPL-MCNC: 98 MG/DL (ref 8–22)
CALCIUM SERPL-MCNC: 7.1 MG/DL (ref 8.5–10.5)
CALCIUM SERPL-MCNC: 7.2 MG/DL (ref 8.5–10.5)
CALCIUM SERPL-MCNC: 7.6 MG/DL (ref 8.5–10.5)
CALCIUM SERPL-MCNC: 8.2 MG/DL (ref 8.5–10.5)
CALCIUM SERPL-MCNC: 8.9 MG/DL (ref 8.5–10.5)
CHLORIDE SERPL-SCNC: 100 MMOL/L (ref 96–112)
CHLORIDE SERPL-SCNC: 105 MMOL/L (ref 96–112)
CHLORIDE SERPL-SCNC: 105 MMOL/L (ref 96–112)
CHLORIDE SERPL-SCNC: 106 MMOL/L (ref 96–112)
CHLORIDE SERPL-SCNC: 95 MMOL/L (ref 96–112)
CO2 BLDA-SCNC: 22 MMOL/L (ref 20–33)
CO2 BLDA-SCNC: 23 MMOL/L (ref 20–33)
CO2 BLDA-SCNC: 24 MMOL/L (ref 20–33)
CO2 BLDA-SCNC: 24 MMOL/L (ref 20–33)
CO2 BLDA-SCNC: 26 MMOL/L (ref 20–33)
CO2 SERPL-SCNC: 17 MMOL/L (ref 20–33)
CO2 SERPL-SCNC: 18 MMOL/L (ref 20–33)
CO2 SERPL-SCNC: 19 MMOL/L (ref 20–33)
CO2 SERPL-SCNC: 19 MMOL/L (ref 20–33)
CO2 SERPL-SCNC: 20 MMOL/L (ref 20–33)
COLOR UR: YELLOW
COMMENT 1642: NORMAL
CREAT SERPL-MCNC: 10.47 MG/DL (ref 0.5–1.4)
CREAT SERPL-MCNC: 5.04 MG/DL (ref 0.5–1.4)
CREAT SERPL-MCNC: 5.2 MG/DL (ref 0.5–1.4)
CREAT SERPL-MCNC: 6.66 MG/DL (ref 0.5–1.4)
CREAT SERPL-MCNC: 8.79 MG/DL (ref 0.5–1.4)
CRP SERPL HS-MCNC: 11.9 MG/DL (ref 0–0.75)
CRP SERPL HS-MCNC: 21.14 MG/DL (ref 0–0.75)
D DIMER PPP IA.FEU-MCNC: 1.38 UG/ML (FEU) (ref 0–0.5)
D DIMER PPP IA.FEU-MCNC: 1.45 UG/ML (FEU) (ref 0–0.5)
DELSYS IDSYS: ABNORMAL
END TIDAL CARBON DIOXIDE IECO2: 20 MMHG
END TIDAL CARBON DIOXIDE IECO2: 26 MMHG
END TIDAL CARBON DIOXIDE IECO2: 30 MMHG
EOSINOPHIL # BLD AUTO: 0.01 K/UL (ref 0–0.51)
EOSINOPHIL # BLD AUTO: 0.04 K/UL (ref 0–0.51)
EOSINOPHIL NFR BLD: 0.1 % (ref 0–6.9)
EOSINOPHIL NFR BLD: 0.5 % (ref 0–6.9)
EPI CELLS #/AREA URNS HPF: ABNORMAL /HPF
ERYTHROCYTE [DISTWIDTH] IN BLOOD BY AUTOMATED COUNT: 53.4 FL (ref 35.9–50)
ERYTHROCYTE [DISTWIDTH] IN BLOOD BY AUTOMATED COUNT: 53.5 FL (ref 35.9–50)
ERYTHROCYTE [DISTWIDTH] IN BLOOD BY AUTOMATED COUNT: 53.8 FL (ref 35.9–50)
ERYTHROCYTE [DISTWIDTH] IN BLOOD BY AUTOMATED COUNT: 54.3 FL (ref 35.9–50)
ERYTHROCYTE [DISTWIDTH] IN BLOOD BY AUTOMATED COUNT: 54.6 FL (ref 35.9–50)
EST. AVERAGE GLUCOSE BLD GHB EST-MCNC: 252 MG/DL
GLOBULIN SER CALC-MCNC: 3.2 G/DL (ref 1.9–3.5)
GLOBULIN SER CALC-MCNC: 3.4 G/DL (ref 1.9–3.5)
GLUCOSE BLD-MCNC: 117 MG/DL (ref 65–99)
GLUCOSE BLD-MCNC: 138 MG/DL (ref 65–99)
GLUCOSE BLD-MCNC: 143 MG/DL (ref 65–99)
GLUCOSE BLD-MCNC: 154 MG/DL (ref 65–99)
GLUCOSE BLD-MCNC: 155 MG/DL (ref 65–99)
GLUCOSE BLD-MCNC: 158 MG/DL (ref 65–99)
GLUCOSE BLD-MCNC: 158 MG/DL (ref 65–99)
GLUCOSE BLD-MCNC: 162 MG/DL (ref 65–99)
GLUCOSE BLD-MCNC: 167 MG/DL (ref 65–99)
GLUCOSE BLD-MCNC: 172 MG/DL (ref 65–99)
GLUCOSE BLD-MCNC: 173 MG/DL (ref 65–99)
GLUCOSE BLD-MCNC: 184 MG/DL (ref 65–99)
GLUCOSE BLD-MCNC: 205 MG/DL (ref 65–99)
GLUCOSE BLD-MCNC: 240 MG/DL (ref 65–99)
GLUCOSE BLD-MCNC: 241 MG/DL (ref 65–99)
GLUCOSE SERPL-MCNC: 146 MG/DL (ref 65–99)
GLUCOSE SERPL-MCNC: 147 MG/DL (ref 65–99)
GLUCOSE SERPL-MCNC: 166 MG/DL (ref 65–99)
GLUCOSE SERPL-MCNC: 210 MG/DL (ref 65–99)
GLUCOSE SERPL-MCNC: 225 MG/DL (ref 65–99)
GLUCOSE UR STRIP.AUTO-MCNC: NEGATIVE MG/DL
GRAM STN SPEC: NORMAL
GRAM STN SPEC: NORMAL
GRAN CASTS #/AREA URNS LPF: ABNORMAL /LPF
HBA1C MFR BLD: 10.4 % (ref 4–5.6)
HCO3 BLDA-SCNC: 20.4 MMOL/L (ref 17–25)
HCO3 BLDA-SCNC: 22 MMOL/L (ref 17–25)
HCO3 BLDA-SCNC: 22.5 MMOL/L (ref 17–25)
HCO3 BLDA-SCNC: 23 MMOL/L (ref 17–25)
HCO3 BLDA-SCNC: 24.2 MMOL/L (ref 17–25)
HCT VFR BLD AUTO: 31.1 % (ref 42–52)
HCT VFR BLD AUTO: 37.4 % (ref 42–52)
HCT VFR BLD AUTO: 38.1 % (ref 42–52)
HCT VFR BLD AUTO: 38.2 % (ref 42–52)
HCT VFR BLD AUTO: 40.6 % (ref 42–52)
HGB BLD-MCNC: 11.7 G/DL (ref 14–18)
HGB BLD-MCNC: 11.7 G/DL (ref 14–18)
HGB BLD-MCNC: 12 G/DL (ref 14–18)
HGB BLD-MCNC: 12.3 G/DL (ref 14–18)
HGB BLD-MCNC: 9.5 G/DL (ref 14–18)
HOROWITZ INDEX BLDA+IHG-RTO: 101 MM[HG]
HOROWITZ INDEX BLDA+IHG-RTO: 114 MM[HG]
HOROWITZ INDEX BLDA+IHG-RTO: 70 MM[HG]
HOROWITZ INDEX BLDA+IHG-RTO: 86 MM[HG]
HOROWITZ INDEX BLDA+IHG-RTO: 95 MM[HG]
IMM GRANULOCYTES # BLD AUTO: 0.12 K/UL (ref 0–0.11)
IMM GRANULOCYTES # BLD AUTO: 0.14 K/UL (ref 0–0.11)
IMM GRANULOCYTES NFR BLD AUTO: 1.5 % (ref 0–0.9)
IMM GRANULOCYTES NFR BLD AUTO: 1.7 % (ref 0–0.9)
KETONES UR STRIP.AUTO-MCNC: NEGATIVE MG/DL
LEUKOCYTE ESTERASE UR QL STRIP.AUTO: ABNORMAL
LV EJECT FRACT  99904: 65
LYMPHOCYTES # BLD AUTO: 0.4 K/UL (ref 1–4.8)
LYMPHOCYTES # BLD AUTO: 0.63 K/UL (ref 1–4.8)
LYMPHOCYTES NFR BLD: 4.9 % (ref 22–41)
LYMPHOCYTES NFR BLD: 7.6 % (ref 22–41)
MAGNESIUM SERPL-MCNC: 2.9 MG/DL (ref 1.5–2.5)
MAGNESIUM SERPL-MCNC: 3.1 MG/DL (ref 1.5–2.5)
MAGNESIUM SERPL-MCNC: 3.2 MG/DL (ref 1.5–2.5)
MAGNESIUM SERPL-MCNC: 3.2 MG/DL (ref 1.5–2.5)
MAGNESIUM SERPL-MCNC: 3.7 MG/DL (ref 1.5–2.5)
MCH RBC QN AUTO: 30.8 PG (ref 27–33)
MCH RBC QN AUTO: 31 PG (ref 27–33)
MCH RBC QN AUTO: 31.2 PG (ref 27–33)
MCH RBC QN AUTO: 31.5 PG (ref 27–33)
MCH RBC QN AUTO: 31.7 PG (ref 27–33)
MCHC RBC AUTO-ENTMCNC: 30.3 G/DL (ref 33.7–35.3)
MCHC RBC AUTO-ENTMCNC: 30.5 G/DL (ref 33.7–35.3)
MCHC RBC AUTO-ENTMCNC: 30.6 G/DL (ref 33.7–35.3)
MCHC RBC AUTO-ENTMCNC: 31.3 G/DL (ref 33.7–35.3)
MCHC RBC AUTO-ENTMCNC: 31.5 G/DL (ref 33.7–35.3)
MCV RBC AUTO: 100.5 FL (ref 81.4–97.8)
MCV RBC AUTO: 100.8 FL (ref 81.4–97.8)
MCV RBC AUTO: 101.5 FL (ref 81.4–97.8)
MCV RBC AUTO: 101.6 FL (ref 81.4–97.8)
MCV RBC AUTO: 101.9 FL (ref 81.4–97.8)
MICRO URNS: ABNORMAL
MODE IMODE: ABNORMAL
MONOCYTES # BLD AUTO: 0.22 K/UL (ref 0–0.85)
MONOCYTES # BLD AUTO: 0.22 K/UL (ref 0–0.85)
MONOCYTES NFR BLD AUTO: 2.7 % (ref 0–13.4)
MONOCYTES NFR BLD AUTO: 2.7 % (ref 0–13.4)
MORPHOLOGY BLD-IMP: NORMAL
MRSA DNA SPEC QL NAA+PROBE: NORMAL
NEUTROPHILS # BLD AUTO: 7.22 K/UL (ref 1.82–7.42)
NEUTROPHILS # BLD AUTO: 7.45 K/UL (ref 1.82–7.42)
NEUTROPHILS NFR BLD: 87.6 % (ref 44–72)
NEUTROPHILS NFR BLD: 90.5 % (ref 44–72)
NITRITE UR QL STRIP.AUTO: NEGATIVE
NRBC # BLD AUTO: 0 K/UL
NRBC # BLD AUTO: 0 K/UL
NRBC BLD-RTO: 0 /100 WBC
NRBC BLD-RTO: 0 /100 WBC
NT-PROBNP SERPL IA-MCNC: 970 PG/ML (ref 0–125)
O2/TOTAL GAS SETTING VFR VENT: 100 %
O2/TOTAL GAS SETTING VFR VENT: 70 %
O2/TOTAL GAS SETTING VFR VENT: 80 %
PCO2 BLDA: 42.2 MMHG (ref 26–37)
PCO2 BLDA: 48.2 MMHG (ref 26–37)
PCO2 BLDA: 50.4 MMHG (ref 26–37)
PCO2 BLDA: 51 MMHG (ref 26–37)
PCO2 BLDA: 58.1 MMHG (ref 26–37)
PCO2 TEMP ADJ BLDA: 40.6 MMHG (ref 26–37)
PCO2 TEMP ADJ BLDA: 46.8 MMHG (ref 26–37)
PCO2 TEMP ADJ BLDA: 48.5 MMHG (ref 26–37)
PCO2 TEMP ADJ BLDA: 50.5 MMHG (ref 26–37)
PCO2 TEMP ADJ BLDA: 58.8 MMHG (ref 26–37)
PEEP END EXPIRATORY PRESSURE IPEEP: 14 CMH20
PH BLDA: 7.23 [PH] (ref 7.4–7.5)
PH BLDA: 7.24 [PH] (ref 7.4–7.5)
PH BLDA: 7.25 [PH] (ref 7.4–7.5)
PH BLDA: 7.27 [PH] (ref 7.4–7.5)
PH BLDA: 7.33 [PH] (ref 7.4–7.5)
PH TEMP ADJ BLDA: 7.22 [PH] (ref 7.4–7.5)
PH TEMP ADJ BLDA: 7.24 [PH] (ref 7.4–7.5)
PH TEMP ADJ BLDA: 7.26 [PH] (ref 7.4–7.5)
PH TEMP ADJ BLDA: 7.28 [PH] (ref 7.4–7.5)
PH TEMP ADJ BLDA: 7.34 [PH] (ref 7.4–7.5)
PH UR STRIP.AUTO: 5 [PH] (ref 5–8)
PHOSPHATE SERPL-MCNC: 13.9 MG/DL (ref 2.5–4.5)
PHOSPHATE SERPL-MCNC: 8.5 MG/DL (ref 2.5–4.5)
PHOSPHATE SERPL-MCNC: 9.1 MG/DL (ref 2.5–4.5)
PHOSPHATE SERPL-MCNC: 9.2 MG/DL (ref 2.5–4.5)
PLATELET # BLD AUTO: 279 K/UL (ref 164–446)
PLATELET # BLD AUTO: 297 K/UL (ref 164–446)
PLATELET # BLD AUTO: 320 K/UL (ref 164–446)
PLATELET # BLD AUTO: 326 K/UL (ref 164–446)
PLATELET # BLD AUTO: 360 K/UL (ref 164–446)
PMV BLD AUTO: 9.3 FL (ref 9–12.9)
PMV BLD AUTO: 9.4 FL (ref 9–12.9)
PMV BLD AUTO: 9.6 FL (ref 9–12.9)
PMV BLD AUTO: 9.7 FL (ref 9–12.9)
PMV BLD AUTO: 9.7 FL (ref 9–12.9)
PO2 BLDA: 60 MMHG (ref 64–87)
PO2 BLDA: 70 MMHG (ref 64–87)
PO2 BLDA: 71 MMHG (ref 64–87)
PO2 BLDA: 76 MMHG (ref 64–87)
PO2 BLDA: 80 MMHG (ref 64–87)
PO2 TEMP ADJ BLDA: 56 MMHG (ref 64–87)
PO2 TEMP ADJ BLDA: 67 MMHG (ref 64–87)
PO2 TEMP ADJ BLDA: 72 MMHG (ref 64–87)
PO2 TEMP ADJ BLDA: 72 MMHG (ref 64–87)
PO2 TEMP ADJ BLDA: 79 MMHG (ref 64–87)
POTASSIUM SERPL-SCNC: 4.7 MMOL/L (ref 3.6–5.5)
POTASSIUM SERPL-SCNC: 4.8 MMOL/L (ref 3.6–5.5)
POTASSIUM SERPL-SCNC: 4.9 MMOL/L (ref 3.6–5.5)
POTASSIUM SERPL-SCNC: 5.1 MMOL/L (ref 3.6–5.5)
POTASSIUM SERPL-SCNC: 7 MMOL/L (ref 3.6–5.5)
PROCALCITONIN SERPL-MCNC: 2.57 NG/ML
PROT SERPL-MCNC: 5.7 G/DL (ref 6–8.2)
PROT SERPL-MCNC: 6.2 G/DL (ref 6–8.2)
PROT UR QL STRIP: 30 MG/DL
RBC # BLD AUTO: 3.06 M/UL (ref 4.7–6.1)
RBC # BLD AUTO: 3.71 M/UL (ref 4.7–6.1)
RBC # BLD AUTO: 3.75 M/UL (ref 4.7–6.1)
RBC # BLD AUTO: 3.79 M/UL (ref 4.7–6.1)
RBC # BLD AUTO: 4 M/UL (ref 4.7–6.1)
RBC # URNS HPF: ABNORMAL /HPF
RBC UR QL AUTO: ABNORMAL
SAO2 % BLDA: 88 % (ref 93–99)
SAO2 % BLDA: 90 % (ref 93–99)
SAO2 % BLDA: 90 % (ref 93–99)
SAO2 % BLDA: 93 % (ref 93–99)
SAO2 % BLDA: 93 % (ref 93–99)
SIGNIFICANT IND 70042: NORMAL
SITE SITE: NORMAL
SODIUM SERPL-SCNC: 132 MMOL/L (ref 135–145)
SODIUM SERPL-SCNC: 135 MMOL/L (ref 135–145)
SODIUM SERPL-SCNC: 141 MMOL/L (ref 135–145)
SODIUM SERPL-SCNC: 143 MMOL/L (ref 135–145)
SODIUM SERPL-SCNC: 143 MMOL/L (ref 135–145)
SOURCE SOURCE: NORMAL
SP GR UR STRIP.AUTO: 1.02
SPECIMEN DRAWN FROM PATIENT: ABNORMAL
SPERM #/AREA URNS HPF: ABNORMAL /HPF
TIDAL VOLUME IVT: 450 ML
TRIGL SERPL-MCNC: 229 MG/DL (ref 0–149)
TRIGL SERPL-MCNC: 542 MG/DL (ref 0–149)
TSH SERPL DL<=0.005 MIU/L-ACNC: 0.08 UIU/ML (ref 0.38–5.33)
UROBILINOGEN UR STRIP.AUTO-MCNC: 0.2 MG/DL
WBC # BLD AUTO: 12.8 K/UL (ref 4.8–10.8)
WBC # BLD AUTO: 17 K/UL (ref 4.8–10.8)
WBC # BLD AUTO: 8.2 K/UL (ref 4.8–10.8)
WBC # BLD AUTO: 8.2 K/UL (ref 4.8–10.8)
WBC # BLD AUTO: 9.3 K/UL (ref 4.8–10.8)
WBC #/AREA URNS HPF: ABNORMAL /HPF

## 2021-01-01 PROCEDURE — 94799 UNLISTED PULMONARY SVC/PX: CPT

## 2021-01-01 PROCEDURE — 700111 HCHG RX REV CODE 636 W/ 250 OVERRIDE (IP): Performed by: INTERNAL MEDICINE

## 2021-01-01 PROCEDURE — 82962 GLUCOSE BLOOD TEST: CPT | Mod: 91

## 2021-01-01 PROCEDURE — 302136 NUTRITION PUMP: Performed by: EMERGENCY MEDICINE

## 2021-01-01 PROCEDURE — 700102 HCHG RX REV CODE 250 W/ 637 OVERRIDE(OP): Performed by: INTERNAL MEDICINE

## 2021-01-01 PROCEDURE — 80053 COMPREHEN METABOLIC PANEL: CPT

## 2021-01-01 PROCEDURE — 99291 CRITICAL CARE FIRST HOUR: CPT | Performed by: EMERGENCY MEDICINE

## 2021-01-01 PROCEDURE — 83735 ASSAY OF MAGNESIUM: CPT

## 2021-01-01 PROCEDURE — 71045 X-RAY EXAM CHEST 1 VIEW: CPT

## 2021-01-01 PROCEDURE — 85027 COMPLETE CBC AUTOMATED: CPT

## 2021-01-01 PROCEDURE — 84100 ASSAY OF PHOSPHORUS: CPT

## 2021-01-01 PROCEDURE — 82803 BLOOD GASES ANY COMBINATION: CPT

## 2021-01-01 PROCEDURE — 94760 N-INVAS EAR/PLS OXIMETRY 1: CPT

## 2021-01-01 PROCEDURE — 03HY32Z INSERTION OF MONITORING DEVICE INTO UPPER ARTERY, PERCUTANEOUS APPROACH: ICD-10-PCS | Performed by: INTERNAL MEDICINE

## 2021-01-01 PROCEDURE — 770022 HCHG ROOM/CARE - ICU (200)

## 2021-01-01 PROCEDURE — 83036 HEMOGLOBIN GLYCOSYLATED A1C: CPT

## 2021-01-01 PROCEDURE — 700105 HCHG RX REV CODE 258: Performed by: INTERNAL MEDICINE

## 2021-01-01 PROCEDURE — 700101 HCHG RX REV CODE 250: Performed by: EMERGENCY MEDICINE

## 2021-01-01 PROCEDURE — 93325 DOPPLER ECHO COLOR FLOW MAPG: CPT

## 2021-01-01 PROCEDURE — 93308 TTE F-UP OR LMTD: CPT | Mod: 26 | Performed by: INTERNAL MEDICINE

## 2021-01-01 PROCEDURE — 700105 HCHG RX REV CODE 258: Performed by: EMERGENCY MEDICINE

## 2021-01-01 PROCEDURE — 37799 UNLISTED PX VASCULAR SURGERY: CPT

## 2021-01-01 PROCEDURE — 5A1945Z RESPIRATORY VENTILATION, 24-96 CONSECUTIVE HOURS: ICD-10-PCS | Performed by: INTERNAL MEDICINE

## 2021-01-01 PROCEDURE — A9270 NON-COVERED ITEM OR SERVICE: HCPCS | Performed by: INTERNAL MEDICINE

## 2021-01-01 PROCEDURE — 36620 INSERTION CATHETER ARTERY: CPT

## 2021-01-01 PROCEDURE — 83880 ASSAY OF NATRIURETIC PEPTIDE: CPT

## 2021-01-01 PROCEDURE — 36620 INSERTION CATHETER ARTERY: CPT | Performed by: INTERNAL MEDICINE

## 2021-01-01 PROCEDURE — 84478 ASSAY OF TRIGLYCERIDES: CPT

## 2021-01-01 PROCEDURE — 85379 FIBRIN DEGRADATION QUANT: CPT

## 2021-01-01 PROCEDURE — 85025 COMPLETE CBC W/AUTO DIFF WBC: CPT

## 2021-01-01 PROCEDURE — 99292 CRITICAL CARE ADDL 30 MIN: CPT | Mod: 25 | Performed by: INTERNAL MEDICINE

## 2021-01-01 PROCEDURE — 86140 C-REACTIVE PROTEIN: CPT

## 2021-01-01 PROCEDURE — 80048 BASIC METABOLIC PNL TOTAL CA: CPT

## 2021-01-01 PROCEDURE — 700101 HCHG RX REV CODE 250: Performed by: STUDENT IN AN ORGANIZED HEALTH CARE EDUCATION/TRAINING PROGRAM

## 2021-01-01 PROCEDURE — 99291 CRITICAL CARE FIRST HOUR: CPT | Mod: 25 | Performed by: INTERNAL MEDICINE

## 2021-01-01 PROCEDURE — 700102 HCHG RX REV CODE 250 W/ 637 OVERRIDE(OP): Performed by: STUDENT IN AN ORGANIZED HEALTH CARE EDUCATION/TRAINING PROGRAM

## 2021-01-01 PROCEDURE — 87205 SMEAR GRAM STAIN: CPT

## 2021-01-01 PROCEDURE — 84145 PROCALCITONIN (PCT): CPT

## 2021-01-01 PROCEDURE — A9270 NON-COVERED ITEM OR SERVICE: HCPCS | Performed by: STUDENT IN AN ORGANIZED HEALTH CARE EDUCATION/TRAINING PROGRAM

## 2021-01-01 PROCEDURE — 84443 ASSAY THYROID STIM HORMONE: CPT

## 2021-01-01 PROCEDURE — 82962 GLUCOSE BLOOD TEST: CPT

## 2021-01-01 PROCEDURE — 94002 VENT MGMT INPAT INIT DAY: CPT

## 2021-01-01 PROCEDURE — 87070 CULTURE OTHR SPECIMN AEROBIC: CPT

## 2021-01-01 PROCEDURE — 94003 VENT MGMT INPAT SUBQ DAY: CPT

## 2021-01-01 PROCEDURE — 81001 URINALYSIS AUTO W/SCOPE: CPT

## 2021-01-01 PROCEDURE — 700101 HCHG RX REV CODE 250: Performed by: INTERNAL MEDICINE

## 2021-01-01 PROCEDURE — 700111 HCHG RX REV CODE 636 W/ 250 OVERRIDE (IP): Performed by: EMERGENCY MEDICINE

## 2021-01-01 PROCEDURE — 99292 CRITICAL CARE ADDL 30 MIN: CPT | Performed by: EMERGENCY MEDICINE

## 2021-01-01 PROCEDURE — 87641 MR-STAPH DNA AMP PROBE: CPT

## 2021-01-01 RX ORDER — ONDANSETRON 4 MG/1
4 TABLET, ORALLY DISINTEGRATING ORAL EVERY 4 HOURS PRN
Status: DISCONTINUED | OUTPATIENT
Start: 2021-01-01 | End: 2021-01-01

## 2021-01-01 RX ORDER — ONDANSETRON 2 MG/ML
4 INJECTION INTRAMUSCULAR; INTRAVENOUS EVERY 4 HOURS PRN
Status: DISCONTINUED | OUTPATIENT
Start: 2021-01-01 | End: 2021-01-01

## 2021-01-01 RX ORDER — FAMOTIDINE 20 MG/1
20 TABLET, FILM COATED ORAL DAILY
Status: DISCONTINUED | OUTPATIENT
Start: 2021-01-01 | End: 2021-01-01

## 2021-01-01 RX ORDER — CALCIUM CARBONATE 500(1250)
500 TABLET ORAL 2 TIMES DAILY WITH MEALS
Status: DISCONTINUED | OUTPATIENT
Start: 2021-01-01 | End: 2021-01-01

## 2021-01-01 RX ORDER — DEXAMETHASONE SODIUM PHOSPHATE 4 MG/ML
6 INJECTION, SOLUTION INTRA-ARTICULAR; INTRALESIONAL; INTRAMUSCULAR; INTRAVENOUS; SOFT TISSUE ONCE
Status: COMPLETED | OUTPATIENT
Start: 2021-01-01 | End: 2021-01-01

## 2021-01-01 RX ORDER — NOREPINEPHRINE BITARTRATE 0.03 MG/ML
0-30 INJECTION, SOLUTION INTRAVENOUS CONTINUOUS
Status: DISCONTINUED | OUTPATIENT
Start: 2021-01-01 | End: 2021-01-01

## 2021-01-01 RX ORDER — LORAZEPAM 2 MG/ML
5 INJECTION INTRAMUSCULAR
Status: DISCONTINUED | OUTPATIENT
Start: 2021-01-01 | End: 2021-01-01 | Stop reason: HOSPADM

## 2021-01-01 RX ORDER — VECURONIUM BROMIDE 1 MG/ML
0.1 INJECTION, POWDER, LYOPHILIZED, FOR SOLUTION INTRAVENOUS ONCE
Status: DISCONTINUED | OUTPATIENT
Start: 2021-01-01 | End: 2021-01-01

## 2021-01-01 RX ORDER — DEXMEDETOMIDINE HYDROCHLORIDE 4 UG/ML
.1-1.5 INJECTION, SOLUTION INTRAVENOUS CONTINUOUS
Status: DISCONTINUED | OUTPATIENT
Start: 2021-01-01 | End: 2021-01-01

## 2021-01-01 RX ORDER — HEPARIN SODIUM 5000 [USP'U]/ML
5000 INJECTION, SOLUTION INTRAVENOUS; SUBCUTANEOUS EVERY 8 HOURS
Status: DISCONTINUED | OUTPATIENT
Start: 2021-01-01 | End: 2021-01-01

## 2021-01-01 RX ORDER — POLYETHYLENE GLYCOL 3350 17 G/17G
1 POWDER, FOR SOLUTION ORAL
Status: DISCONTINUED | OUTPATIENT
Start: 2021-01-01 | End: 2021-01-01

## 2021-01-01 RX ORDER — LINEZOLID 2 MG/ML
600 INJECTION, SOLUTION INTRAVENOUS EVERY 12 HOURS
Status: DISCONTINUED | OUTPATIENT
Start: 2021-01-01 | End: 2021-01-01

## 2021-01-01 RX ORDER — BISACODYL 10 MG
10 SUPPOSITORY, RECTAL RECTAL
Status: DISCONTINUED | OUTPATIENT
Start: 2021-01-01 | End: 2021-01-01

## 2021-01-01 RX ORDER — DEXTROSE MONOHYDRATE 25 G/50ML
50 INJECTION, SOLUTION INTRAVENOUS
Status: DISCONTINUED | OUTPATIENT
Start: 2021-01-01 | End: 2021-01-01

## 2021-01-01 RX ORDER — DEXTROSE MONOHYDRATE 25 G/50ML
50 INJECTION, SOLUTION INTRAVENOUS ONCE
Status: COMPLETED | OUTPATIENT
Start: 2021-01-01 | End: 2021-01-01

## 2021-01-01 RX ORDER — VECURONIUM BROMIDE 1 MG/ML
0.1 INJECTION, POWDER, LYOPHILIZED, FOR SOLUTION INTRAVENOUS
Status: DISCONTINUED | OUTPATIENT
Start: 2021-01-01 | End: 2021-01-01

## 2021-01-01 RX ORDER — ACETAMINOPHEN 325 MG/1
650 TABLET ORAL EVERY 6 HOURS PRN
Status: DISCONTINUED | OUTPATIENT
Start: 2021-01-01 | End: 2021-01-01

## 2021-01-01 RX ORDER — IPRATROPIUM BROMIDE AND ALBUTEROL SULFATE 2.5; .5 MG/3ML; MG/3ML
3 SOLUTION RESPIRATORY (INHALATION)
Status: DISCONTINUED | OUTPATIENT
Start: 2021-01-01 | End: 2021-01-01

## 2021-01-01 RX ORDER — AMOXICILLIN 250 MG
2 CAPSULE ORAL 2 TIMES DAILY
Status: DISCONTINUED | OUTPATIENT
Start: 2021-01-01 | End: 2021-01-01

## 2021-01-01 RX ADMIN — MINERAL OIL, PETROLATUM 1 APPLICATION: 425; 573 OINTMENT OPHTHALMIC at 06:00

## 2021-01-01 RX ADMIN — HEPARIN SODIUM 5000 UNITS: 5000 INJECTION, SOLUTION INTRAVENOUS; SUBCUTANEOUS at 05:24

## 2021-01-01 RX ADMIN — HEPARIN SODIUM 5000 UNITS: 5000 INJECTION, SOLUTION INTRAVENOUS; SUBCUTANEOUS at 21:17

## 2021-01-01 RX ADMIN — HEPARIN SODIUM 5000 UNITS: 5000 INJECTION, SOLUTION INTRAVENOUS; SUBCUTANEOUS at 14:11

## 2021-01-01 RX ADMIN — VECURONIUM BROMIDE 0.2 MCG/KG/MIN: 1 INJECTION, POWDER, LYOPHILIZED, FOR SOLUTION INTRAVENOUS at 18:31

## 2021-01-01 RX ADMIN — LINEZOLID 600 MG: 600 INJECTION, SOLUTION INTRAVENOUS at 20:18

## 2021-01-01 RX ADMIN — FAMOTIDINE 20 MG: 10 INJECTION INTRAVENOUS at 05:15

## 2021-01-01 RX ADMIN — DOCUSATE SODIUM 50 MG AND SENNOSIDES 8.6 MG 2 TABLET: 8.6; 5 TABLET, FILM COATED ORAL at 17:43

## 2021-01-01 RX ADMIN — MINERAL OIL, PETROLATUM 1 APPLICATION: 425; 573 OINTMENT OPHTHALMIC at 22:02

## 2021-01-01 RX ADMIN — INSULIN HUMAN 2 UNITS: 100 INJECTION, SOLUTION PARENTERAL at 12:04

## 2021-01-01 RX ADMIN — FAMOTIDINE 20 MG: 20 TABLET ORAL at 04:48

## 2021-01-01 RX ADMIN — INSULIN HUMAN 2 UNITS: 100 INJECTION, SOLUTION PARENTERAL at 11:57

## 2021-01-01 RX ADMIN — HEPARIN SODIUM 5000 UNITS: 5000 INJECTION, SOLUTION INTRAVENOUS; SUBCUTANEOUS at 15:19

## 2021-01-01 RX ADMIN — PROPOFOL 25 MCG/KG/MIN: 10 INJECTION, EMULSION INTRAVENOUS at 16:51

## 2021-01-01 RX ADMIN — PROPOFOL 45 MCG/KG/MIN: 10 INJECTION, EMULSION INTRAVENOUS at 14:10

## 2021-01-01 RX ADMIN — MINERAL OIL, PETROLATUM 1 APPLICATION: 425; 573 OINTMENT OPHTHALMIC at 22:00

## 2021-01-01 RX ADMIN — LINEZOLID 600 MG: 600 INJECTION, SOLUTION INTRAVENOUS at 05:15

## 2021-01-01 RX ADMIN — FENTANYL CITRATE 200 MCG/HR: 50 INJECTION, SOLUTION INTRAMUSCULAR; INTRAVENOUS at 05:27

## 2021-01-01 RX ADMIN — PROPOFOL 45 MCG/KG/MIN: 10 INJECTION, EMULSION INTRAVENOUS at 08:27

## 2021-01-01 RX ADMIN — PROPOFOL 55 MCG/KG/MIN: 10 INJECTION, EMULSION INTRAVENOUS at 02:11

## 2021-01-01 RX ADMIN — HEPARIN SODIUM 5000 UNITS: 5000 INJECTION, SOLUTION INTRAVENOUS; SUBCUTANEOUS at 05:34

## 2021-01-01 RX ADMIN — INSULIN HUMAN 2 UNITS: 100 INJECTION, SOLUTION PARENTERAL at 18:06

## 2021-01-01 RX ADMIN — MINERAL OIL, PETROLATUM 1 APPLICATION: 425; 573 OINTMENT OPHTHALMIC at 14:10

## 2021-01-01 RX ADMIN — MIDAZOLAM HYDROCHLORIDE 7 MG/HR: 5 INJECTION, SOLUTION INTRAMUSCULAR; INTRAVENOUS at 18:30

## 2021-01-01 RX ADMIN — PROPOFOL 40 MCG/KG/MIN: 10 INJECTION, EMULSION INTRAVENOUS at 04:39

## 2021-01-01 RX ADMIN — HEPARIN SODIUM 5000 UNITS: 5000 INJECTION, SOLUTION INTRAVENOUS; SUBCUTANEOUS at 22:02

## 2021-01-01 RX ADMIN — INSULIN HUMAN 3 UNITS: 100 INJECTION, SOLUTION PARENTERAL at 00:17

## 2021-01-01 RX ADMIN — INSULIN HUMAN 2 UNITS: 100 INJECTION, SOLUTION PARENTERAL at 11:18

## 2021-01-01 RX ADMIN — PROPOFOL 40 MCG/KG/MIN: 10 INJECTION, EMULSION INTRAVENOUS at 01:12

## 2021-01-01 RX ADMIN — PROPOFOL 55 MCG/KG/MIN: 10 INJECTION, EMULSION INTRAVENOUS at 20:05

## 2021-01-01 RX ADMIN — INSULIN HUMAN 3 UNITS: 100 INJECTION, SOLUTION PARENTERAL at 13:12

## 2021-01-01 RX ADMIN — FENTANYL CITRATE 300 MCG: 50 INJECTION, SOLUTION INTRAMUSCULAR; INTRAVENOUS at 03:08

## 2021-01-01 RX ADMIN — MINERAL OIL, PETROLATUM 1 APPLICATION: 425; 573 OINTMENT OPHTHALMIC at 05:16

## 2021-01-01 RX ADMIN — INSULIN HUMAN 2 UNITS: 100 INJECTION, SOLUTION PARENTERAL at 17:30

## 2021-01-01 RX ADMIN — MINERAL OIL, PETROLATUM 1 APPLICATION: 425; 573 OINTMENT OPHTHALMIC at 13:15

## 2021-01-01 RX ADMIN — INSULIN HUMAN 2 UNITS: 100 INJECTION, SOLUTION PARENTERAL at 20:21

## 2021-01-01 RX ADMIN — FENTANYL CITRATE 300 MCG/HR: 50 INJECTION, SOLUTION INTRAMUSCULAR; INTRAVENOUS at 14:10

## 2021-01-01 RX ADMIN — DEXMEDETOMIDINE 0.2 MCG/KG/HR: 200 INJECTION, SOLUTION INTRAVENOUS at 20:46

## 2021-01-01 RX ADMIN — HEPARIN SODIUM 5000 UNITS: 5000 INJECTION, SOLUTION INTRAVENOUS; SUBCUTANEOUS at 05:07

## 2021-01-01 RX ADMIN — PROPOFOL 40 MCG/KG/MIN: 10 INJECTION, EMULSION INTRAVENOUS at 04:29

## 2021-01-01 RX ADMIN — LORAZEPAM 4 MG: 2 INJECTION INTRAMUSCULAR; INTRAVENOUS at 15:03

## 2021-01-01 RX ADMIN — PROPOFOL 40 MCG/KG/MIN: 10 INJECTION, EMULSION INTRAVENOUS at 17:43

## 2021-01-01 RX ADMIN — FENTANYL CITRATE 2500 MCG: 50 INJECTION, SOLUTION INTRAMUSCULAR; INTRAVENOUS at 21:25

## 2021-01-01 RX ADMIN — FENTANYL CITRATE 300 MCG/HR: 50 INJECTION, SOLUTION INTRAMUSCULAR; INTRAVENOUS at 09:49

## 2021-01-01 RX ADMIN — INSULIN HUMAN 2 UNITS: 100 INJECTION, SOLUTION PARENTERAL at 01:14

## 2021-01-01 RX ADMIN — DEXAMETHASONE SODIUM PHOSPHATE 6 MG: 4 INJECTION, SOLUTION INTRA-ARTICULAR; INTRALESIONAL; INTRAMUSCULAR; INTRAVENOUS; SOFT TISSUE at 08:58

## 2021-01-01 RX ADMIN — INSULIN HUMAN 2 UNITS: 100 INJECTION, SOLUTION PARENTERAL at 17:43

## 2021-01-01 RX ADMIN — PROPOFOL 40 MCG/KG/MIN: 10 INJECTION, EMULSION INTRAVENOUS at 21:17

## 2021-01-01 RX ADMIN — PROPOFOL 40 MCG/KG/MIN: 10 INJECTION, EMULSION INTRAVENOUS at 08:57

## 2021-01-01 RX ADMIN — FAMOTIDINE 20 MG: 20 TABLET ORAL at 05:24

## 2021-01-01 RX ADMIN — DOCUSATE SODIUM 50 MG AND SENNOSIDES 8.6 MG 2 TABLET: 8.6; 5 TABLET, FILM COATED ORAL at 04:48

## 2021-01-01 RX ADMIN — INSULIN HUMAN 2 UNITS: 100 INJECTION, SOLUTION PARENTERAL at 06:05

## 2021-01-01 RX ADMIN — DOCUSATE SODIUM 50 MG AND SENNOSIDES 8.6 MG 2 TABLET: 8.6; 5 TABLET, FILM COATED ORAL at 05:33

## 2021-01-01 RX ADMIN — FENTANYL CITRATE 200 MCG/HR: 50 INJECTION, SOLUTION INTRAMUSCULAR; INTRAVENOUS at 18:32

## 2021-01-01 RX ADMIN — MINERAL OIL, PETROLATUM 1 APPLICATION: 425; 573 OINTMENT OPHTHALMIC at 22:59

## 2021-01-01 RX ADMIN — HEPARIN SODIUM 5000 UNITS: 5000 INJECTION, SOLUTION INTRAVENOUS; SUBCUTANEOUS at 21:25

## 2021-01-01 RX ADMIN — HEPARIN SODIUM 5000 UNITS: 5000 INJECTION, SOLUTION INTRAVENOUS; SUBCUTANEOUS at 22:59

## 2021-01-01 RX ADMIN — DEXMEDETOMIDINE 0.5 MCG/KG/HR: 200 INJECTION, SOLUTION INTRAVENOUS at 13:14

## 2021-01-01 RX ADMIN — PROPOFOL 40 MCG/KG/MIN: 10 INJECTION, EMULSION INTRAVENOUS at 01:06

## 2021-01-01 RX ADMIN — MINERAL OIL, PETROLATUM 1 APPLICATION: 425; 573 OINTMENT OPHTHALMIC at 21:17

## 2021-01-01 RX ADMIN — MINERAL OIL, PETROLATUM 1 APPLICATION: 425; 573 OINTMENT OPHTHALMIC at 05:24

## 2021-01-01 RX ADMIN — NOREPINEPHRINE BITARTRATE 3 MCG/MIN: 1 INJECTION INTRAVENOUS at 18:30

## 2021-01-01 RX ADMIN — HEPARIN SODIUM 5000 UNITS: 5000 INJECTION, SOLUTION INTRAVENOUS; SUBCUTANEOUS at 13:14

## 2021-01-01 RX ADMIN — CALCIUM 500 MG: 500 TABLET ORAL at 08:44

## 2021-01-01 RX ADMIN — DEXTROSE MONOHYDRATE 50 ML: 25 INJECTION, SOLUTION INTRAVENOUS at 04:57

## 2021-01-01 RX ADMIN — MINERAL OIL, PETROLATUM 1 APPLICATION: 425; 573 OINTMENT OPHTHALMIC at 05:33

## 2021-01-01 RX ADMIN — PROPOFOL 40 MCG/KG/MIN: 10 INJECTION, EMULSION INTRAVENOUS at 21:12

## 2021-01-01 RX ADMIN — PROPOFOL 35 MCG/KG/MIN: 10 INJECTION, EMULSION INTRAVENOUS at 12:00

## 2021-01-01 RX ADMIN — FENTANYL CITRATE 300 MCG: 50 INJECTION, SOLUTION INTRAMUSCULAR; INTRAVENOUS at 21:17

## 2021-01-01 RX ADMIN — CEFEPIME 2 G: 2 INJECTION, POWDER, FOR SOLUTION INTRAVENOUS at 20:19

## 2021-01-01 RX ADMIN — PROPOFOL 45 MCG/KG/MIN: 10 INJECTION, EMULSION INTRAVENOUS at 11:08

## 2021-01-01 RX ADMIN — PROPOFOL 55 MCG/KG/MIN: 10 INJECTION, EMULSION INTRAVENOUS at 04:48

## 2021-01-01 RX ADMIN — HEPARIN SODIUM 5000 UNITS: 5000 INJECTION, SOLUTION INTRAVENOUS; SUBCUTANEOUS at 15:53

## 2021-01-01 RX ADMIN — HEPARIN SODIUM 5000 UNITS: 5000 INJECTION, SOLUTION INTRAVENOUS; SUBCUTANEOUS at 05:15

## 2021-01-01 RX ADMIN — DEXMEDETOMIDINE 0.2 MCG/KG/HR: 200 INJECTION, SOLUTION INTRAVENOUS at 08:30

## 2021-01-01 RX ADMIN — INSULIN HUMAN 3 UNITS: 100 INJECTION, SOLUTION PARENTERAL at 05:41

## 2021-01-01 RX ADMIN — PROPOFOL 55 MCG/KG/MIN: 10 INJECTION, EMULSION INTRAVENOUS at 22:59

## 2021-01-01 RX ADMIN — DOCUSATE SODIUM 50 MG AND SENNOSIDES 8.6 MG 2 TABLET: 8.6; 5 TABLET, FILM COATED ORAL at 16:51

## 2021-01-01 RX ADMIN — FENTANYL CITRATE 100 MCG/HR: 50 INJECTION, SOLUTION INTRAMUSCULAR; INTRAVENOUS at 17:30

## 2021-01-01 RX ADMIN — DEXMEDETOMIDINE 0.5 MCG/KG/HR: 200 INJECTION, SOLUTION INTRAVENOUS at 05:26

## 2021-01-01 RX ADMIN — PROPOFOL 30 MCG/KG/MIN: 10 INJECTION, EMULSION INTRAVENOUS at 18:31

## 2021-01-01 RX ADMIN — DOCUSATE SODIUM 50 MG AND SENNOSIDES 8.6 MG 2 TABLET: 8.6; 5 TABLET, FILM COATED ORAL at 05:24

## 2021-01-01 RX ADMIN — FAMOTIDINE 20 MG: 20 TABLET ORAL at 05:34

## 2021-01-01 RX ADMIN — MINERAL OIL, PETROLATUM 1 APPLICATION: 425; 573 OINTMENT OPHTHALMIC at 15:19

## 2021-01-01 ASSESSMENT — FIBROSIS 4 INDEX: FIB4 SCORE: 1.48

## 2021-10-13 PROBLEM — N17.9 AKI (ACUTE KIDNEY INJURY) (HCC): Status: ACTIVE | Noted: 2021-01-01

## 2021-10-13 PROBLEM — J96.00 ACUTE RESPIRATORY FAILURE DUE TO COVID-19 (HCC): Status: ACTIVE | Noted: 2021-01-01

## 2021-10-13 PROBLEM — U07.1 ACUTE RESPIRATORY FAILURE DUE TO COVID-19 (HCC): Status: ACTIVE | Noted: 2021-01-01

## 2021-10-13 PROBLEM — I95.9 HYPOTENSION: Status: ACTIVE | Noted: 2021-01-01

## 2021-10-13 NOTE — CONSULTS
[facility] INTENSIVIST TRIAGE OFFICER ICU TRANSFER REPORT  Transfer requested by: Dr Londono (contact number [phone])    Chief complaint: Severe Covid pneumonia with PF ratio 73, hypotension on norepinephrine and elevated creatinine with concerns for need for dialysis.    Pertinent history/ICU Course: Patient is a morbidly obese, nonvaccinated, diabetic with hypertension who is a 76-year-old male who first developed Covid symptoms in September and then was admitted 10/4 to [facility].  He was intubated yesterday for severe hypoxemia and currently is in their ICU and he escalated to max settings fairly rapidly.  They have a [facility] Intensivist Group that has been helping them and is currently on AC 34, tidal volume 460 (6 cc/KG), PEEP 14 and FiO2 1.0.  However whenever they try to turn him he desaturates into the low 80s.  They have not been able to prone him.  The hospitalist could not describe plateau or peak airway pressures at this time.  However they do have them paralyzed with vecuronium and is on continuous infusions of propofol, fentanyl and Versed for sedation.  His most recent blood gas is 57/7.27/73 which revealed improved respiratory acidosis.  Hypotension is present and he is requiring continuous infusion of norepinephrine at 9 mics.  He was started on Decadron but has not received tocilizumab or any other remittive agent.  D-dimer is mildly elevated in the 2.9 range and they have him on prophylactic heparin.  His creatinine has increased over the last few days and its 3.4.  Potassium was up to 5.6 this a.m. but pharmacotherapy has brought it down nicely to 5.0 and he is still making urine.  CODE STATUS was addressed today and he is DNAR/I okay for now.  Family was not sure what to think about the possibility of dialysis per hospitalist team.    Code Status: DNAR/I ok    Consultants involved and pertinent input from consultants: Reviewed this case at length with the hospitalist physician.  I  suggested to her he has a mortality rate in the 93 to 100% range being on the ventilator with his age, elevated BMI and comorbidities including hypertension, diabetes and WILNER.  Developing renal failure is a bad prognostic factor.  The patient is at great risk for transfer.  I recommended they prone him and work with the intensivist group to adjust his ventilator and facilitate the appropriate ventilator for transfer.  Hopefully they can improve his oxygenation a little bit with proning and by some time to facilitate transfer.  They  should put on the transport vent for an hour in their ICU to make sure he tolerates it before loading him in to a transportation vehicle.  They also probably should send additional pressors like vasopressin and extra bags of norepinephrine as clinically prudent for his transfer.  Blood gas prior to loading him on the transport after he had been on the transport ventilator to get a baseline would be helpful as well.  Finally, I recommended Dr. Londono review goals of care again with the family.  If he progresses to renal failure needs dialysis there is a good chance it may be permanent with this overall story.  And he has a pretty high risk transfer as well and the need to understand that before it is arranged.    Addendum 10/13:  Callback from Dr. Londono at [facility].  Patient tolerated proning overnight and FiO2 was decreased to 90% and sats remained over 90.  He was placed supine today and he had to go back up to 100%.  Vent settings are unchanged.  He remains heavily sedated and paralyzed.  Norepinephrine doses then wean down from 9-7.  Potassium is improved to 4.6 but creatinine is gone up to 3.87.  Urine output is acceptable but poor.  Last ABG 7.3 4/42/69 on 100%.  CODE STATUS remains DN AR/I okay and apparently wife considering dialysis.    Floor requested: ICU    Intensivist assigned to transfer: Harper took the call, patient to be assigned on arrival to the  appropriate intensivist.    For any question or concerns regarding the care of this patient, please reach out to the assigned intensivist on-call at the time of arrival.

## 2021-10-14 PROBLEM — R45.1 AGITATION REQUIRING SEDATION PROTOCOL: Status: ACTIVE | Noted: 2021-01-01

## 2021-10-14 PROBLEM — A41.9 SEPTIC SHOCK (HCC): Status: ACTIVE | Noted: 2021-01-01

## 2021-10-14 PROBLEM — Z71.89 COUNSELING REGARDING END OF LIFE DECISION MAKING: Status: ACTIVE | Noted: 2021-01-01

## 2021-10-14 PROBLEM — R65.21 SEPTIC SHOCK (HCC): Status: ACTIVE | Noted: 2021-01-01

## 2021-10-14 NOTE — ASSESSMENT & PLAN NOTE
Titrate fentanyl for pain < 2  Titrate dexmedetomidine for RASS -3 to 0  Daily sedation holiday when able  Follow [facility] PAD guidelines

## 2021-10-14 NOTE — PROGRESS NOTES
Time out done. Dr Saleem at bedside for right radial art line insertion. Successful insertion with waveform at 1836 hrs.

## 2021-10-14 NOTE — DIETARY
"Nutrition Support Assessment     Nutrition services:   Day 1 of admit.  75 yo male with admitting diagnosis: covid pneumonia    Current problem list:  1. Acute respiratory failure - on vent  2. Acute kidney injury  3. Hypotension  4. hypertriglyceridemia  5. History of type 2 diabetes, COPD, HTN, obesity    Assessment:  Estimated Nutritional Needs: based on: height 5'10\", weight 114 kg, IBW 75 kg, BMI 36.12 - class 2 obesity    Calculation/Equation: obesity guidelines for critical illness. MSJ x 1.0 = 1890 kcals  Calories/day: 1256 - 1596 kcals (11 - 14 kcals/kg)   Protein/day: 112 - 150 g (1.5 - 2.0 g/kg)     Evaluation:   1. Pt on vent in need of nutrition support  2. cortrak placed for Enteral access - confirmation in distal stomach  3. Hyperglycemia - SSI for control  4. BUN 72, Creatinine 5.2, Phosphorus 8.5, magnesium 3.1 - acute renal failure.   5. Propofol @ 23.9 kcals (35 mcg/kg/min) providing 631 kcals/day  6. Pt proned today  7. Pt may be at risk for refeeding syndrome having covid symptoms since September and admitted to [facility] 10/4.     Malnutrition Risk: class 2 obesity    Recommendations/Plan:  1. Start and advance TF slowly secondary to refeeding risk and Covid guidelines.   2. Start Peptamen Intense @ 10 ml/hr and advance slowly by 10 ml q 12 hours to full goal of 50 ml/hr.  3. Peptamen Intense @ 50 ml/hr will provide 1200 kcals, 111 g protein, 960 ml H20/day  4. Same goal on and off propofol  5. Will monitor propofol infusion daily and make adjustments to nutritional provision as appropriate    "

## 2021-10-14 NOTE — ASSESSMENT & PLAN NOTE
Symptom onset in September  Admitted to OSH 10/4 - treated with remdesivir and dexamethasone  Intubated for respiratory failure 10/11  PF ratio consistently in the 60-70 range since intubation on 100%  COVID-19 pneumonia  Dexamethasone - 10 days  Remdesivir - completed course at [facility]  Fluid balance - keep euvolemic    Ventilator dependent respiratory failure  Modify ventilator to optimize oxygenation and ventilation  Proning protocol  D/C chemical paralysis  HOB > 30 when supine  Chlorhexidine  Perform spontaneous breathing trial when able  Early mobility

## 2021-10-14 NOTE — PROGRESS NOTES
"Critical Care Progress Note    Date of admission  10/13/2021    Chief Complaint  Severe Covid pneumonia and possible developing renal failure     \"76 y.o. obese male with a past medical history of diabetes and hypertension and unfortunately no Covid vaccine who presented 10/4/2021 to [facility] for worsening Covid symptoms and was admitted to the hospital.  Symptoms began in September.  He was started on Decadron I believe he had some remdesivir as well.  He never received tocilizumab.  He clinically deteriorated and required intubation on 10/11.  It was very unstable and the hospitalist at [facility] worked with their [place] intensivist to make sure the patient would survive the transfer.  Potassium was high initially and its come down from 5.6 to 4.6.  Urine output is still low acceptable creatinine is trended up from 3.4->3.8 the last 24 hours per records from [facility].  Patient is required norepinephrine infusion since intubation but that dose has been weaning down.  He was on 9 mics yesterday 7 at time of transfer and for now.  He has been sedated with propofol, fentanyl and midazolam.  He has been paralyzed with vecuronium.  They initially thought he was too unstable for proning because he would desat with partial movement and I encouraged him yesterday to prone them anyway and he actually improved overnight and they wean the O2 to 90%.  Supination today he had to go back up to 100% and had a PF ratio in the 60s.  He did an hour on the transport vent and sats remained in the 90s and hemodynamics were acceptable and he received critical care transport and arrived here in our ICU.  He was started on Zyvox and cefepime for possible healthcare associated pneumonia but I have no data yet to support that.  D-dimer by report was borderline elevated its being repeated to see if he needs a noninvasive study.  He did not administer tocilizumab for pharmacy protocol reasons per their hospitalist.  Will " "reassess with our protocol here in the next 12-24 hours.  Arterial line is placed emergently since he still on pressors on max vent support while paralyzed.  He will be proned shortly.  Case reviewed at length earlier in the day with Dr. Londono from [facility].\"    History of Presenting Illness  10/13 - transfer from [facility]  10/14 - D/C vecuronium, continue proning protocol, D/C antibiotics as no signs of bacterial pneumonia    Review of Systems  Review of Systems   Unable to perform ROS: Intubated        Vital Signs for last 24 hours   Pulse:  [58-73] 58  Resp:  [24-39] 32  BP: ()/(50-98) 91/50  SpO2:  [94 %-100 %] 97 %    Hemodynamic parameters for last 24 hours       Respiratory Information for the last 24 hours  Vent Mode: APVCMV  Rate (breaths/min): 32  Vt Target (mL): 450  PEEP/CPAP: 14  MAP: 19  Control VTE (exp VT): 447    Physical Exam   Physical Exam  Vitals and nursing note reviewed.   Constitutional:       Appearance: He is ill-appearing.      Interventions: He is sedated and intubated.   HENT:      Head: Normocephalic and atraumatic.      Right Ear: External ear normal.      Left Ear: External ear normal.      Nose: Nose normal.      Mouth/Throat:      Mouth: Mucous membranes are dry.      Pharynx: Oropharynx is clear.   Eyes:      Pupils: Pupils are equal, round, and reactive to light.   Cardiovascular:      Rate and Rhythm: Regular rhythm. Tachycardia present.      Pulses: Normal pulses.      Heart sounds: Normal heart sounds.   Pulmonary:      Effort: He is intubated.      Breath sounds: Decreased air movement present.      Comments: Coarse bilateral breath sounds  Abdominal:      Palpations: Abdomen is soft.   Musculoskeletal:         General: Normal range of motion.      Cervical back: Normal range of motion and neck supple.      Right lower leg: Edema present.      Left lower leg: Edema present.   Skin:     General: Skin is warm and dry.      Capillary Refill: Capillary refill " takes less than 2 seconds.   Neurological:      Mental Status: He is unresponsive.         Medications  Current Facility-Administered Medications   Medication Dose Route Frequency Provider Last Rate Last Admin   • norepinephrine (Levophed) 8 mg in 250 mL NS infusion (premix)  0-30 mcg/min Intravenous Continuous Hang Saleem M.D.   Paused at 10/13/21 2000   • artificial tears (EYE LUBRICANT) ophth ointment 1 Application  1 Application Both Eyes Q8HRS Hang Saleem M.D.   1 Application at 10/14/21 0516   • propofol (DIPRIVAN) injection  0-80 mcg/kg/min Intravenous Continuous Hang Saleem M.D. 27.4 mL/hr at 10/14/21 0439 40 mcg/kg/min at 10/14/21 0439   • fentaNYL (SUBLIMAZE) 50 mcg/mL in 50mL (Continuous Infusion)   Intravenous Continuous Hang Saleem M.D. 4 mL/hr at 10/14/21 0527 200 mcg/hr at 10/14/21 0527   • vecuronium (NORCURON) injection 11.4 mg  0.1 mg/kg Intravenous Once Hang Saleem M.D.       • vecuronium (NORCURON) 60 mg in dextrose 5% 500 mL Infusion  0-1.7 mcg/kg/min Intravenous Continuous Hang Saleem M.D.   Stopped at 10/14/21 0640   • vecuronium (NORCURON) injection 11.4 mg  0.1 mg/kg Intravenous Q2HRS PRN Hang Saleem M.D.       • acetaminophen (Tylenol) tablet 650 mg  650 mg Enteral Tube Q6HRS PRN Hang Saleem M.D.       • ondansetron (ZOFRAN) syringe/vial injection 4 mg  4 mg Intravenous Q4HRS PRN Hang Saleem M.D.       • ondansetron (ZOFRAN ODT) dispertab 4 mg  4 mg Enteral Tube Q4HRS PRN Hang Saleem M.D.       • Respiratory Therapy Consult   Nebulization Continuous RT Hang Saleem M.D.       • famotidine (PEPCID) tablet 20 mg  20 mg Enteral Tube DAILY Hang Saleem M.D.        Or   • famotidine (PEPCID) injection 20 mg  20 mg Intravenous DAILY Hang Saleem M.D.   20 mg at 10/14/21 0515   • senna-docusate (PERICOLACE or SENOKOT S) 8.6-50 MG per tablet 2 Tablet  2 Tablet Enteral Tube BID Hang Saleem M.D.        And    • polyethylene glycol/lytes (MIRALAX) PACKET 1 Packet  1 Packet Enteral Tube QDAY PRN Hang Saleem M.D.        And   • magnesium hydroxide (MILK OF MAGNESIA) suspension 30 mL  30 mL Enteral Tube QDAY PRN Hang Saleem M.D.        And   • bisacodyl (DULCOLAX) suppository 10 mg  10 mg Rectal QDAY PRN Hang Saleem M.D.       • MD Alert...ICU Electrolyte Replacement per Pharmacy   Other PHARMACY TO DOSE Hang Saleem M.D.       • lidocaine (XYLOCAINE) 1 % injection 2 mL  2 mL Tracheal Tube Q30 MIN PRN Hang Saleem M.D.       • ipratropium-albuterol (DUONEB) nebulizer solution  3 mL Nebulization Q2HRS PRN (RT) Hang Saleme M.D.       • fentaNYL (SUBLIMAZE) injection 25 mcg  25 mcg Intravenous Q HOUR PRN Hang Saleem M.D.        Or   • fentaNYL (SUBLIMAZE) injection 50 mcg  50 mcg Intravenous Q HOUR PRN Hang Saleem M.D.        Or   • fentaNYL (SUBLIMAZE) injection 100 mcg  100 mcg Intravenous Q HOUR PRN Hang Saleem M.D.       • dexamethasone (DECADRON) injection 6 mg  6 mg Intravenous Once Hang Saleem M.D.       • insulin regular (HumuLIN R,NovoLIN R) injection  2-9 Units Subcutaneous Q6HRS Hang Saleem M.D.   2 Units at 10/14/21 0114    And   • dextrose 50% (D50W) injection 50 mL  50 mL Intravenous Q15 MIN PRN Hang Saleem M.D.       • heparin injection 5,000 Units  5,000 Units Subcutaneous Q8HRS Hang Saleem M.D.   5,000 Units at 10/14/21 0515       Fluids    Intake/Output Summary (Last 24 hours) at 10/14/2021 0740  Last data filed at 10/14/2021 0600  Gross per 24 hour   Intake 393.36 ml   Output 420 ml   Net -26.64 ml       Laboratory  Recent Labs     10/13/21  1938 10/14/21  0501   ISTATAPH 7.228* 7.267*   ISTATAPCO2 58.1* 50.4*   ISTATAPO2 70 76   ISTATATCO2 26 24   SPOLYKL3UTL 90* 93   ISTATARTHCO3 24.2 23.0   ISTATARTBE -4 -4   ISTATTEMP 99.1 F 97.0 F   ISTATFIO2 100 80   ISTATSPEC Arterial Arterial   ISTATAPHTC 7.224* 7.279*    BLKWPDKQ6ED 72 72         Recent Labs     10/13/21  1950 10/14/21  0330   SODIUM 141 143   POTASSIUM 4.8 4.7   CHLORIDE 105 106   CO2 20 19*   BUN 66* 72*   CREATININE 5.04* 5.20*   MAGNESIUM 2.9* 3.1*   PHOSPHORUS  --  8.5*   CALCIUM 7.2* 7.1*     Recent Labs     10/13/21  1950 10/14/21  0330   ALTSGPT 32 25   ASTSGOT 51* 36   ALKPHOSPHAT 55 49   TBILIRUBIN 0.5 0.4   GLUCOSE 210* 146*     Recent Labs     10/13/21  1950 10/14/21  0330   WBC 8.2 8.2   NEUTSPOLYS 90.50* 87.60*   LYMPHOCYTES 4.90* 7.60*   MONOCYTES 2.70 2.70   EOSINOPHILS 0.10 0.50   BASOPHILS 0.10 0.10   ASTSGOT 51* 36   ALTSGPT 32 25   ALKPHOSPHAT 55 49   TBILIRUBIN 0.5 0.4     Recent Labs     10/13/21  1950 10/14/21  0330   RBC 3.79* 3.06*   HEMOGLOBIN 12.0* 9.5*   HEMATOCRIT 38.1* 31.1*   PLATELETCT 297 360       Imaging  X-Ray:  I have personally reviewed the images and compared with prior images.    Assessment/Plan  * Acute respiratory failure due to COVID-19 (HCC)- (present on admission)  Assessment & Plan  Symptom onset in September  Admitted to OSH 10/4 - treated with remdesivir and dexamethasone  Intubated for respiratory failure 10/11  PF ratio consistently in the 60-70 range since intubation on 100%  COVID-19 pneumonia  Dexamethasone - 10 days  Remdesivir - completed course at [facility]  Fluid balance - keep euvolemic    Ventilator dependent respiratory failure  Modify ventilator to optimize oxygenation and ventilation  Proning protocol  D/C chemical paralysis  HOB > 30 when supine  Chlorhexidine  Perform spontaneous breathing trial when able  Early mobility    Agitation requiring sedation protocol- (present on admission)  Assessment & Plan  Titrate fentanyl for pain < 2  Titrate propofol for RASS -3 to 0  Daily sedation holiday when able  Follow [facility] PAD guidelines    Counseling regarding end of life decision making  Assessment & Plan  Palliative care consultation    WILNER (acute kidney injury) ([facility])- (present on  admission)  Assessment & Plan  Likely due to COVID-19  Avoid nephrotoxins  Monitor electrolytes and replete as needed  Renally dose adjust medications  Strict I/O monitoring  Not a candidate for short-term or long-term renal replacement therapy    Septic shock ([facility])- (present on admission)  Assessment & Plan  Due to COVID-19  Titrate norepinephrine to maintain MAP > 65    Hypertriglyceridemia- (present on admission)  Assessment & Plan  Triglycerides 229  From propofol  Continue to monitor q72 hr    Type 2 diabetes mellitus without complication, without long-term current use of insulin ([facility])- (present on admission)  Assessment & Plan  Hemoglobin A1c 10.4  Sliding scale insulin    COPD suggested by initial evaluation ([facility])- (present on admission)  Assessment & Plan  Possible history of COPD, not bronchospastic at this time  With hypercarbia will trial bronchodilators  Monitor for the need for scheduled nebulizer treatments    Essential hypertension- (present on admission)  Assessment & Plan  Hold home antihypertensives    Obesity (BMI 35.0-39.9 without comorbidity) ([facility])- (present on admission)  Assessment & Plan  Some history of ZULMA per old records  No recent TSH  Further history from family when available  Monitor for sleep disordered breathing if he survives to the point of extubation     DVT prophylaxis: SQ heparin  PUD prophylaxis: Famotidine  Glycemic control: Sliding scale insulin  Nutrition: Tube feeds  Lines: Needed  Erazo: Need for strict I/O monitoring, remove when able  Mobility: Early mobility as tolerated  Goals of care: DNAR/I okay  Disposition: ICU    I have performed a physical exam and reviewed and updated ROS and Plan today (10/14/2021). In review of yesterday's note (10/13/2021), there are no changes except as documented above.     Discussed patient condition and risk of morbidity and/or mortality with RN, RT, Pharmacy and Charge nurse / hot rounds     The patient remains critically ill.  Critical  care time = 65 minutes in directly providing and coordinating critical care and extensive data review.  No time overlap and excludes procedures.

## 2021-10-14 NOTE — ASSESSMENT & PLAN NOTE
Possible history of COPD, not bronchospastic at this time  With hypercarbia will trial bronchodilators  Monitor for the need for scheduled nebulizer treatments

## 2021-10-14 NOTE — PROGRESS NOTES
Cortrak Placement    Tube Team verified patient name and medical record number prior to tube placement.  Cortrak tube (43 inches, 12 Citizen of [place]) placed at 80 cm in left nare.  Per Cortrak picture, tube appears to be in the stomach.  Nursing Instructions: Awaiting KUB to confirm placement before use for medications or feeding. Once placement confirmed, flush tube with 30 ml of water, and then remove and save stylet, in patient medication drawer.

## 2021-10-14 NOTE — PROCEDURES
Arterial Line Insertion    Date/Time: 10/13/2021 6:54 PM  Performed by: Hang Saleem M.D.  Authorized by: Hang Saleem M.D.   Consent: The procedure was performed in an emergent situation.  Site marked: the operative site was marked  Patient identity confirmed: arm band  Preparation: Patient was prepped and draped in the usual sterile fashion.  Indications: multiple ABGs, respiratory failure and hemodynamic monitoring  Location: right radial    Sedation:  Patient sedated: yes  Sedatives: midazolam, fentanyl and propofol  Sedation start date/time: 10/13/2021 6:39 PM  Sedation end date/time: 10/13/2021 6:54 PM    Gilberto's test normal: yes  Needle gauge: 20  Seldinger technique: Seldinger technique used  Number of attempts: 2  Post-procedure: line sutured and dressing applied  Post-procedure CMS: unchanged  Patient tolerance: patient tolerated the procedure well with no immediate complications        Full vent support, severe COVID, about to prone again, titrating NE drip for hypotenion

## 2021-10-14 NOTE — ASSESSMENT & PLAN NOTE
Likely due to COVID-19  Avoid nephrotoxins  Monitor electrolytes and replete as needed  Renally dose adjust medications  Strict I/O monitoring  Not a candidate for short-term or long-term renal replacement therapy

## 2021-10-14 NOTE — PROGRESS NOTES
Pt arrived with transport from [place]. Intubated on full vent support R34 fio2 100% p 14. Drips infusing as follows: Propofol 30 mcg/kg/min, Versed 7 mg/hr, Fentanyl 140 mcg/hr (14 ml/hr), Vecuronium 0.2 mcg/kg/min, Levophed 6 mcg/min, titrated down to 4mcg/min (/88). CHG bath completed. Mepilex to sacrum, knees, shoulders. Dr Saleem notified of arrival/admit  pending. Temp 98.2f. Old montes de oca removed (80 cc urine sediment and hazy colored) new temp sensing montes de oca inserted via sterile technique.    Belongings: upper & lower dentures in pink cup, iPhone with black otter case and , pair of slippers, jeans with a belt attached, grey sweatshirt, navy canvas wallet; cash = $450 (4 x 100, 1 x 50), ID and assortment of cards, keys, pocket knife, cloth face mask.  4 Eyes Skin Assessment Completed by Lulu Fang, SAQIB and Kristy PORTER RN.    Head Redness  Ears Redness  Nose WDL  Mouth Redness Rt lower lip scab  Neck WDL RT TLC  Breast/Chest Redness petechiae  Shoulder Blades WDL  Spine Redness and Blanching  (R) Arm/Elbow/Hand Redness to rt armpit  (L) Arm/Elbow/Hand Bruising  Abdomen WDL  Groin WDL  Scrotum/Coccyx/Buttocks Redness and Blanching  (R) Leg Redness  (L) Leg WDL  (R) Heel/Foot/Toe WDL  (L) Heel/Foot/Toe WDL              Devices In Places ECG, Blood Pressure Cuff, Pulse Ox, [place], ET Tube and Central Line      Interventions In Place Sacral Mepilex, Waffle Overlay, Pillows, Elbow Mepilex, Low Air Loss Mattress and Pressure Redistribution Mattress    Possible Skin Injury No    Pictures Uploaded Into Epic Yes  Wound Consult Placed N/A  RN Wound Prevention Protocol Ordered No

## 2021-10-14 NOTE — ASSESSMENT & PLAN NOTE
Some history of ZULMA per old records  No recent TSH  Further history from family when available  Monitor for sleep disordered breathing if he survives to the point of extubation

## 2021-10-14 NOTE — ASSESSMENT & PLAN NOTE
Palliative care consultation  10/17 - I was finally able to get in touch with the patient's wife, Virginia at [phone], and informed her of Estuardo's declining condition.  I informed her that there were no additional meaningful treatments we could offer Estuardo given his multisystem organ failure with increasing oxygen requirement and worsening renal function.  Virginia currently has Covid (diagnosed 10/6/2021) so we will arrange a video conference so she can see Estuardo.  We were able to get Virginia cleared so she can come to the bedside to see Estuardo.  We had an increasing discussion about his continually worsening condition and made the decision to transition to comfort measures with a compassionate extubation.  Appropriate orders placed and counseling provided.

## 2021-10-14 NOTE — PROGRESS NOTES
4 Eyes Skin Assessment Completed by SAQIB Haywood and SAQIB Sylvester.    Head Blanching, Swelling and Redness  Ears Redness and Blanching  Nose Redness and Blanching  Mouth Ulcer(s) to R lower lip  Neck Redness and Blanching  Breast/Chest Redness and Blanching  Shoulder Blades Redness and Blanching  Spine WDL  (R) Arm/Elbow/Hand Redness and Blanching, swelling, and discoloration to top of hand  (L) Arm/Elbow/Hand Redness and Blanching, swelling, and discoloration to top of hand  Abdomen Redness, Blanching and Bruising  Groin Redness and Blanching  Scrotum/Coccyx/Buttocks Redness and Blanching, blistering.  Photo taken and uploaded.  (R) Leg Redness, Blanching and Edema.  Slow to tremaine redness to ankles and knees, offloading in place.  (L) Leg Redness, Blanching and Swelling.  Slow to tremaine redness to ankles and knees, offloading in place.  (R) Heel/Foot/Toe Redness and Blanching  (L) Heel/Foot/Toe Redness and Blanching          Devices In Places ECG, Blood Pressure Cuff, Pulse Ox, Erazo, Arterial Line, SCD's, OG/NG and Central Line      Interventions In Place Sacral Mepilex, Heel Float Boots, Waffle Overlay, TAP System, Pillows, Q2 Turns, Low Air Loss Mattress and ZFlo Pillow    Possible Skin Injury Yes    Pictures Uploaded Into Epic Yes  Wound Consult Placed Yes  RN Wound Prevention Protocol Ordered Yes

## 2021-10-14 NOTE — CONSULTS
Critical Care Consultation    Date of consult: 10/13/2021    Referring Physician  Dr. Mony Londono, [facility] attending    Reason for Consultation  Severe Covid pneumonia and possible developing renal failure    History of Presenting Illness  76 y.o. obese male with a past medical history of diabetes and hypertension and unfortunately no Covid vaccine who presented 10/4/2021 to [facility] for worsening Covid symptoms and was admitted to the hospital.  Symptoms began in September.  He was started on Decadron I believe he had some remdesivir as well.  He never received tocilizumab.  He clinically deteriorated and required intubation on 10/11.  It was very unstable and the hospitalist at [facility] worked with their [facility] intensivist to make sure the patient would survive the transfer.  Potassium was high initially and its come down from 5.6 to 4.6.  Urine output is still low acceptable creatinine is trended up from 3.4->3.8 the last 24 hours per records from [facility].  Patient is required norepinephrine infusion since intubation but that dose has been weaning down.  He was on 9 mics yesterday 7 at time of transfer and for now.  He has been sedated with propofol, fentanyl and midazolam.  He has been paralyzed with vecuronium.  They initially thought he was too unstable for proning because he would desat with partial movement and I encouraged him yesterday to prone them anyway and he actually improved overnight and they wean the O2 to 90%.  Supination today he had to go back up to 100% and had a PF ratio in the 60s.  He did an hour on the transport vent and sats remained in the 90s and hemodynamics were acceptable and he received critical care transport and arrived here in our ICU.  He was started on Zyvox and cefepime for possible healthcare associated pneumonia but I have no data yet to support that.  D-dimer by report was borderline elevated its being repeated to see if he needs a  noninvasive study.  He did not administer tocilizumab for pharmacy protocol reasons per their hospitalist.  Will reassess with our protocol here in the next 12-24 hours.  Arterial line is placed emergently since he still on pressors on max vent support while paralyzed.  He will be proned shortly.  Case reviewed at length earlier in the day with Dr. Londono from [facility].    Code Status  DNAR, I OK    Review of Systems  Review of Systems   Unable to perform ROS: Acuity of condition       Past Medical History   has a past medical history of CATARACT, Dental disorder, and Heart burn.    Surgical History   has a past surgical history that includes lumbar laminectomy diskectomy (3/25/2009); shoulder surgery; and nasal flap (4/14/2011).    Family History  family history is not on file.    Social History   reports that he quit smoking about 28 years ago. He smoked 0.00 packs per day. He has never used smokeless tobacco. He reports that he does not drink alcohol and does not use drugs.    Medications  Home Medications    **Home medications have not yet been reviewed for this encounter**       Current Facility-Administered Medications   Medication Dose Route Frequency Provider Last Rate Last Admin   • norepinephrine (Levophed) 8 mg in 250 mL NS infusion (premix)  0-30 mcg/min Intravenous Continuous Hang Saleem M.D.   Paused at 10/13/21 2000   • artificial tears (EYE LUBRICANT) ophth ointment 1 Application  1 Application Both Eyes Q8HRS Hang Saleem M.D.       • propofol (DIPRIVAN) injection  0-80 mcg/kg/min Intravenous Continuous Hang Saleem M.D. 27.4 mL/hr at 10/13/21 2112 40 mcg/kg/min at 10/13/21 2112   • fentaNYL (SUBLIMAZE) 50 mcg/mL in 50mL (Continuous Infusion)   Intravenous Continuous Hang Saleem M.D. 4 mL/hr at 10/13/21 2000 200 mcg/hr at 10/13/21 2000   • vecuronium (NORCURON) injection 11.4 mg  0.1 mg/kg Intravenous Once Hang Saleem M.D.       • vecuronium (NORCURON) 60 mg  in dextrose 5% 500 mL Infusion  0-1.7 mcg/kg/min Intravenous Continuous Hang Saleem M.D. 11.4 mL/hr at 10/13/21 2002 0.2 mcg/kg/min at 10/13/21 2002   • vecuronium (NORCURON) injection 11.4 mg  0.1 mg/kg Intravenous Q2HRS PRN Hang Saleem M.D.       • midazolam (VERSED) premix 125 mg/125 mL NS  0-10 mg/hr Intravenous Continuous Hang Saleem M.D. 3 mL/hr at 10/13/21 2030 3 mg/hr at 10/13/21 2030   • acetaminophen (Tylenol) tablet 650 mg  650 mg Enteral Tube Q6HRS PRN Hang Saleem M.D.       • ondansetron (ZOFRAN) syringe/vial injection 4 mg  4 mg Intravenous Q4HRS PRN Hang Saleem M.D.       • ondansetron (ZOFRAN ODT) dispertab 4 mg  4 mg Enteral Tube Q4HRS PRN Hang Saleem M.D.       • Respiratory Therapy Consult   Nebulization Continuous RT Hang Saleem M.D.       • [START ON 10/14/2021] famotidine (PEPCID) tablet 20 mg  20 mg Enteral Tube DAILY Hang Saleem M.D.        Or   • [START ON 10/14/2021] famotidine (PEPCID) injection 20 mg  20 mg Intravenous DAILY Hang Saleem M.D.       • senna-docusate (PERICOLACE or SENOKOT S) 8.6-50 MG per tablet 2 Tablet  2 Tablet Enteral Tube BID Hang Saleem M.D.        And   • polyethylene glycol/lytes (MIRALAX) PACKET 1 Packet  1 Packet Enteral Tube QDAY PRN Hang Saleem M.D.        And   • magnesium hydroxide (MILK OF MAGNESIA) suspension 30 mL  30 mL Enteral Tube QDAY PRN Hang Saleem M.D.        And   • bisacodyl (DULCOLAX) suppository 10 mg  10 mg Rectal QDAY PRN Hang Saleem M.D.       • MD Alert...ICU Electrolyte Replacement per Pharmacy   Other PHARMACY TO DOSE Hang Saleem M.D.       • lidocaine (XYLOCAINE) 1 % injection 2 mL  2 mL Tracheal Tube Q30 MIN PRN Hang Saleem M.D.       • ipratropium-albuterol (DUONEB) nebulizer solution  3 mL Nebulization Q2HRS PRN (RT) Hang Saleem M.D.       • fentaNYL (SUBLIMAZE) injection 25 mcg  25 mcg Intravenous Q HOUR PRN Hang PIKE  IVAN Saleem        Or   • fentaNYL (SUBLIMAZE) injection 50 mcg  50 mcg Intravenous Q HOUR PRN Hang Saleem M.D.        Or   • fentaNYL (SUBLIMAZE) injection 100 mcg  100 mcg Intravenous Q HOUR PRN Hang Saleem M.D.       • [START ON 10/14/2021] dexamethasone (DECADRON) injection 6 mg  6 mg Intravenous Once Hang Saleem M.D.       • Linezolid (ZYVOX) premix 600 mg  600 mg Intravenous Q12HRS Hang Saleem M.D.   Stopped at 10/13/21 2118   • cefepime (Maxipime) 2 g in  mL IVPB  2 g Intravenous Q24HR Hang Saleem M.D.   Stopped at 10/13/21 2049   • insulin regular (HumuLIN R,NovoLIN R) injection  2-9 Units Subcutaneous Q6HRS Hang Saleem M.D.   2 Units at 10/13/21 2021    And   • dextrose 50% (D50W) injection 50 mL  50 mL Intravenous Q15 MIN PRN Hang Saleem M.D.       • heparin injection 5,000 Units  5,000 Units Subcutaneous Q8HRS Hang Saleem M.D.           Allergies  No Known Allergies    Vital Signs last 24 hours  Pulse:  [59-69] 69  Resp:  [24-39] 24  BP: (114-139)/(55-98) 122/59  SpO2:  [95 %-100 %] 95 %    Physical Exam  Physical Exam  Constitutional:       Appearance: He is obese. He is ill-appearing. He is not toxic-appearing.      Interventions: He is sedated, chemically paralyzed and intubated.   HENT:      Head: Normocephalic and atraumatic.      Mouth/Throat:      Mouth: Mucous membranes are moist.      Comments: Endotracheal tube  Eyes:      General: No scleral icterus.     Pupils: Pupils are equal, round, and reactive to light.   Neck:      Vascular: No JVD.      Comments: Right IJ triple-lumen placed 10/11 by hospitalist at [facility], site looks good  Cardiovascular:      Rate and Rhythm: Normal rate and regular rhythm.      Pulses: Normal pulses.      Heart sounds: No murmur heard.   No gallop.       Comments: SR, IVC normal size, LV function likely okay, poor images  Pulmonary:      Effort: He is intubated.      Breath sounds: Rhonchi and  rales (Basilar) present. No wheezing.   Abdominal:      General: Bowel sounds are normal. There is no distension.      Palpations: Abdomen is soft.   Genitourinary:     Comments: Erazo catheter  Musculoskeletal:      Right lower leg: No edema.      Left lower leg: No edema.      Comments: Right radial arterial line placed on admission 10/13   Skin:     General: Skin is warm and dry.      Capillary Refill: Capillary refill takes less than 2 seconds.      Coloration: Skin is not cyanotic, jaundiced or mottled.      Nails: There is no clubbing.      Comments: Darryl face otherwise skin intact so far, only proned once night of 10/12   Neurological:      Comments: Cranial nerves grossly intact but patient paralyzed and not participating with exam.  Per report and discussions with transferring physician he was neurologically intact throughout his entire admission but once sedated he has been paralyzed since 10/11   Psychiatric:      Comments: Unable to assess         Fluids    Intake/Output Summary (Last 24 hours) at 10/13/2021 2114  Last data filed at 10/13/2021 1730  Gross per 24 hour   Intake --   Output 80 ml   Net -80 ml       Laboratory  Recent Results (from the past 48 hour(s))   POCT arterial blood gas device results    Collection Time: 10/13/21  7:38 PM   Result Value Ref Range    Ph 7.228 (LL) 7.400 - 7.500    Pco2 58.1 (HH) 26.0 - 37.0 mmHg    Po2 70 64 - 87 mmHg    Tco2 26 20 - 33 mmol/L    S02 90 (L) 93 - 99 %    Hco3 24.2 17.0 - 25.0 mmol/L    BE -4 -4 - 3 mmol/L    Body Temp 99.1 F degrees    O2 Therapy 100 %    iPF Ratio 70     Ph Temp Yoni 7.224 (LL) 7.400 - 7.500    Pco2 Temp Co 58.8 (HH) 26.0 - 37.0 mmHg    Po2 Temp Cor 72 64 - 87 mmHg    Specimen Arterial     DelSys Vent     Tidal Volume 450 mL    Peep End Expiratory Pressure 14 cmh20    Set Rate 24     Mode APV-CMV    POCT glucose device results    Collection Time: 10/13/21  7:49 PM   Result Value Ref Range    Glucose - Accu-Ck 184 (H) 65 - 99 mg/dL    D-DIMER    Collection Time: 10/13/21  7:50 PM   Result Value Ref Range    D-Dimer Screen 1.38 (H) 0.00 - 0.50 ug/mL (FEU)   TSH    Collection Time: 10/13/21  7:50 PM   Result Value Ref Range    TSH 0.080 (L) 0.380 - 5.330 uIU/mL   HEMOGLOBIN A1C    Collection Time: 10/13/21  7:50 PM   Result Value Ref Range    Glycohemoglobin 10.4 (H) 4.0 - 5.6 %    Est Avg Glucose 252 mg/dL   CBC WITH DIFFERENTIAL    Collection Time: 10/13/21  7:50 PM   Result Value Ref Range    WBC 8.2 4.8 - 10.8 K/uL    RBC 3.79 (L) 4.70 - 6.10 M/uL    Hemoglobin 12.0 (L) 14.0 - 18.0 g/dL    Hematocrit 38.1 (L) 42.0 - 52.0 %    .5 (H) 81.4 - 97.8 fL    MCH 31.7 27.0 - 33.0 pg    MCHC 31.5 (L) 33.7 - 35.3 g/dL    RDW 53.4 (H) 35.9 - 50.0 fL    Platelet Count 297 164 - 446 K/uL    MPV 9.6 9.0 - 12.9 fL    Neutrophils-Polys 90.50 (H) 44.00 - 72.00 %    Lymphocytes 4.90 (L) 22.00 - 41.00 %    Monocytes 2.70 0.00 - 13.40 %    Eosinophils 0.10 0.00 - 6.90 %    Basophils 0.10 0.00 - 1.80 %    Immature Granulocytes 1.70 (H) 0.00 - 0.90 %    Nucleated RBC 0.00 /100 WBC    Neutrophils (Absolute) 7.45 (H) 1.82 - 7.42 K/uL    Lymphs (Absolute) 0.40 (L) 1.00 - 4.80 K/uL    Monos (Absolute) 0.22 0.00 - 0.85 K/uL    Eos (Absolute) 0.01 0.00 - 0.51 K/uL    Baso (Absolute) 0.01 0.00 - 0.12 K/uL    Immature Granulocytes (abs) 0.14 (H) 0.00 - 0.11 K/uL    NRBC (Absolute) 0.00 K/uL       Imaging  DX-CHEST-LIMITED (1 VIEW)   Final Result         1. Diffuse, patchy bilateral pulmonary opacity with a small left pleural effusion.      DX-CHEST-PORTABLE (1 VIEW)    (Results Pending)   EC-ECHOCARDIOGRAM COMPLETE W/ CONT    (Results Pending)       Assessment/Plan  * Acute respiratory failure due to COVID-19 (HCC)  Assessment & Plan  Symptom onset in September  Admitted 10/4 and started on RDV and Decadron  Intubated for respiratory failure 10/11, dropped pressure post intubation due to sedation per transferring team  PF ratio consistently in the 60-70 range since  intubation on 100%  Patient hypercarbic and had significantly elevated airway pressures and thus was paralyzed and heavily sedated  Tolerated proning the first time night of 10/12 FiO2 was able to be dropped to 90%  Continuing Decadron, monitor for need to extend beyond 10 days  Low tidal volume strategies ongoing  Prone per protocols  RT protocol/ventilator bundle   SAT/SBT when clinically prudent, not at this time  We will maximize sedation with propofol and fentanyl and attempt to wean off Versed overnight as per guidelines  Reassess in a.m. with clinical pharmacy Re: Use of tocilizumab, not completely convinced he has a nosocomial infection, most recent LFTs are okay-we will follow the protocols  Follow-up a.m. daily with [facility] re: their cultures from the last several days.    WILNER (acute kidney injury) ([facility])  Assessment & Plan  Known longstanding diabetes hypertension  Records did not reveal that he received contrast at [facility]  Severe COVID pneumonia and hypotension requiring pressors likely etiology  Serial BMP  Monitor UO  Avoid nephrotoxins  Not a good candidate for short or long-term RRT given his overall picture  Palliative care consultation  Monitor for the need for nephrology consultation    Hypotension  Assessment & Plan  Unspecified etiology at this time, transferring team felt was related to sedation after intubation  Bedside ultrasound reveals reasonable LV function and normal-sized IVC without collapse while on the ventilator  Continue to actively titrate norepinephrine  Arterial line placed  We will monitor CVP as well  Echocardiogram formally in a.m.    Type 2 diabetes mellitus without complication, without long-term current use of insulin ([facility])- (present on admission)  Assessment & Plan  Check A1c  Serial blood sugars  Start with medium SSI and hold Lantus since he is n.p.o. at this time, may need to switch to high SSI  Transfer records, incomplete MAR, reviewed with pharmacy who will  reach out to pharmacy and [facility] at some point  Hypoglycemia protocols  Monitor for the need for insulin continuous infusion protocol    COPD suggested by initial evaluation ([facility])- (present on admission)  Assessment & Plan  Possible history of COPD, not bronchospastic at this time  With hypercarbia will trial DuoNeb every 4 and see if that helps  Monitor for the need for scheduled nebulizer treatments    Hypertriglyceridemia- (present on admission)  Assessment & Plan  Currently on propofol, triglyceride levels were acceptable and , will continue to monitor per protocols    Essential hypertension- (present on admission)  Assessment & Plan  Currently hypotensive on norepinephrine infusion, hold home regimen, resume when clinically appropriate    Obesity (BMI 35.0-39.9 without comorbidity) ([facility])- (present on admission)  Assessment & Plan  Some history of ZULMA per old records  No recent TSH  Further history from family when available  Monitor for sleep disordered breathing if he survives to the point of extubation      Discussed patient condition and risk of morbidity and/or mortality with RN, RT, Pharmacy and Charge RN and hospitalist from [facility].    The patient remains critically ill.  Critical care time = 85 minutes in directly providing and coordinating critical care and extensive data review.  No time overlap and excludes procedures.

## 2021-10-15 NOTE — PALLIATIVE CARE
Palliative Care follow-up  Consult received and chart reviewed. Estuardo Christian is a 76 year-old male patient with a PMHX of morbid obesity, diabetes, and HTN. He was admitted to [facility] on 10/4 and found to be COVID +. He was treated with remdesivir and decadron. Despite this, he clinically deteriorated requiring intubation for hypoxia on 10/11. Transferred to this facility on 10/13/2021 for consideration of dialysis. Potasium normalized at this time.    1015: Discussed case with RN. No family at bedside as family has COVID as well.  1500: Discussed case with Dr. Castillo. Call placed to patient's mother, Abigail. Voicemail box was not set up. Call placed to signifcant other, Virginia, voice mailbox full.  1630: Attempted to contact family again. No answer.  1715: Attempted to call both [place] and Abigail again prior to leaving for the day. No answer and voicemail boxes unavailabe.    Plan: PC will attempt to reach family again tomorrow. Full consult to follow.    Thank you for allowing Palliative Care to support this patient and family. Contact [phone] for additional assistance, change in patient status, or with any questions/concerns.

## 2021-10-15 NOTE — PROGRESS NOTES
"Critical Care Progress Note    Date of admission  10/13/2021    Chief Complaint  Severe Covid pneumonia and possible developing renal failure     \"76 y.o. obese male with a past medical history of diabetes and hypertension and unfortunately no Covid vaccine who presented 10/4/2021 to [facility] for worsening Covid symptoms and was admitted to the hospital.  Symptoms began in September.  He was started on Decadron I believe he had some remdesivir as well.  He never received tocilizumab.  He clinically deteriorated and required intubation on 10/11.  It was very unstable and the hospitalist at [facility] worked with their [facility] intensivist to make sure the patient would survive the transfer.  Potassium was high initially and its come down from 5.6 to 4.6.  Urine output is still low acceptable creatinine is trended up from 3.4->3.8 the last 24 hours per records from [facility].  Patient is required norepinephrine infusion since intubation but that dose has been weaning down.  He was on 9 mics yesterday 7 at time of transfer and for now.  He has been sedated with propofol, fentanyl and midazolam.  He has been paralyzed with vecuronium.  They initially thought he was too unstable for proning because he would desat with partial movement and I encouraged him yesterday to prone them anyway and he actually improved overnight and they wean the O2 to 90%.  Supination today he had to go back up to 100% and had a PF ratio in the 60s.  He did an hour on the transport vent and sats remained in the 90s and hemodynamics were acceptable and he received critical care transport and arrived here in our ICU.  He was started on Zyvox and cefepime for possible healthcare associated pneumonia but I have no data yet to support that.  D-dimer by report was borderline elevated its being repeated to see if he needs a noninvasive study.  He did not administer tocilizumab for pharmacy protocol reasons per their hospitalist.  Will " "reassess with our protocol here in the next 12-24 hours.  Arterial line is placed emergently since he still on pressors on max vent support while paralyzed.  He will be proned shortly.  Case reviewed at length earlier in the day with Dr. Londono from [facility].\"    History of Presenting Illness  10/13 - transfer from [facility]  10/14 - D/C vecuronium, continue proning protocol, D/C antibiotics as no signs of bacterial pneumonia  10/15 - increase respiratory rate for hypercarbia, decrease FiO2, palliative care    Review of Systems  Review of Systems   Unable to perform ROS: Intubated        Vital Signs for last 24 hours   Pulse:  [67-80] 79  Resp:  [9-34] 34  BP: (100-127)/(55-60) 126/60  SpO2:  [87 %-97 %] 88 %    Hemodynamic parameters for last 24 hours       Respiratory Information for the last 24 hours  Vent Mode: APVCMV  Rate (breaths/min): 34  Vt Target (mL): 450  PEEP/CPAP: 14  MAP: 20  Control VTE (exp VT): 546    Physical Exam   Physical Exam  Vitals and nursing note reviewed.   Constitutional:       Appearance: He is ill-appearing.      Interventions: He is sedated and intubated.   HENT:      Head: Normocephalic and atraumatic.      Right Ear: External ear normal.      Left Ear: External ear normal.      Nose: Nose normal.      Mouth/Throat:      Mouth: Mucous membranes are dry.      Pharynx: Oropharynx is clear.   Eyes:      Pupils: Pupils are equal, round, and reactive to light.   Cardiovascular:      Rate and Rhythm: Regular rhythm. Tachycardia present.      Pulses: Normal pulses.      Heart sounds: Normal heart sounds.   Pulmonary:      Effort: He is intubated.      Breath sounds: Decreased air movement present.      Comments: Coarse bilateral breath sounds  Abdominal:      Palpations: Abdomen is soft.   Musculoskeletal:         General: Normal range of motion.      Cervical back: Normal range of motion and neck supple.      Right lower leg: Edema present.      Left lower leg: Edema present. "   Skin:     General: Skin is warm and dry.      Capillary Refill: Capillary refill takes less than 2 seconds.   Neurological:      Mental Status: He is unresponsive.         Medications  Current Facility-Administered Medications   Medication Dose Route Frequency Provider Last Rate Last Admin   • Pharmacy Consult: Enteral tube insertion - review meds/change route/product selection  1 Each Other PHARMACY TO DOSE Mike Castillo M.D.       • artificial tears (EYE LUBRICANT) ophth ointment 1 Application  1 Application Both Eyes Q8HRS Hang Saleem M.D.   1 Application at 10/15/21 1410   • propofol (DIPRIVAN) injection  0-80 mcg/kg/min Intravenous Continuous Hang Saleem M.D. 30.8 mL/hr at 10/15/21 1410 45 mcg/kg/min at 10/15/21 1410   • fentaNYL (SUBLIMAZE) 50 mcg/mL in 50mL (Continuous Infusion)   Intravenous Continuous Hang Saleem M.D. 6 mL/hr at 10/15/21 1410 300 mcg/hr at 10/15/21 1410   • acetaminophen (Tylenol) tablet 650 mg  650 mg Enteral Tube Q6HRS PRN Hang Saleem M.D.       • ondansetron (ZOFRAN) syringe/vial injection 4 mg  4 mg Intravenous Q4HRS PRN Hang Saleem M.D.       • ondansetron (ZOFRAN ODT) dispertab 4 mg  4 mg Enteral Tube Q4HRS PRN Hang Saleem M.D.       • Respiratory Therapy Consult   Nebulization Continuous RT Hang Saleem M.D.       • famotidine (PEPCID) tablet 20 mg  20 mg Enteral Tube DAILY Hang Saleem M.D.   20 mg at 10/15/21 0448    Or   • famotidine (PEPCID) injection 20 mg  20 mg Intravenous DAILY Hang Saleem M.D.   20 mg at 10/14/21 0515   • senna-docusate (PERICOLACE or SENOKOT S) 8.6-50 MG per tablet 2 Tablet  2 Tablet Enteral Tube BID Hang Saleem M.D.   2 Tablet at 10/15/21 0448    And   • polyethylene glycol/lytes (MIRALAX) PACKET 1 Packet  1 Packet Enteral Tube QDAY PRN Hang B Richeson, M.D.        And   • magnesium hydroxide (MILK OF MAGNESIA) suspension 30 mL  30 mL Enteral Tube QDAY PRN Hang Saleem M.D.         And   • bisacodyl (DULCOLAX) suppository 10 mg  10 mg Rectal QDAY PRN Hang Saleem M.D.       • lidocaine (XYLOCAINE) 1 % injection 2 mL  2 mL Tracheal Tube Q30 MIN PRN Hang Saleem M.D.       • ipratropium-albuterol (DUONEB) nebulizer solution  3 mL Nebulization Q2HRS PRN (RT) Hang Saleem M.D.       • fentaNYL (SUBLIMAZE) injection 25 mcg  25 mcg Intravenous Q HOUR PRN Hang Saleem M.D.        Or   • fentaNYL (SUBLIMAZE) injection 50 mcg  50 mcg Intravenous Q HOUR PRN Hang Saleem M.D.        Or   • fentaNYL (SUBLIMAZE) injection 100 mcg  100 mcg Intravenous Q HOUR PRN Hang Saleem M.D.       • insulin regular (HumuLIN R,NovoLIN R) injection  2-9 Units Subcutaneous Q6HRS Hang Saleem M.D.   2 Units at 10/15/21 1118    And   • dextrose 50% (D50W) injection 50 mL  50 mL Intravenous Q15 MIN PRN Hang Saleem M.D.       • heparin injection 5,000 Units  5,000 Units Subcutaneous Q8HRS Hang Saleem M.D.   5,000 Units at 10/15/21 1411       Fluids    Intake/Output Summary (Last 24 hours) at 10/15/2021 1612  Last data filed at 10/15/2021 1400  Gross per 24 hour   Intake 1520.9 ml   Output 710 ml   Net 810.9 ml       Laboratory  Recent Labs     10/14/21  0501 10/14/21  1256 10/15/21  0439   ISTATAPH 7.267* 7.326* 7.253*   ISTATAPCO2 50.4* 42.2* 51.0*   ISTATAPO2 76 60* 80   ISTATATCO2 24 23 24   [id number] 93 88* 93   ISTATARTHCO3 23.0 22.0 22.5   ISTATARTBE -4 -4 -5*   ISTATTEMP 97.0 F 97.0 F 98.2 F   ISTATFIO2 80 70 70   ISTATSPEC Arterial Arterial Arterial   ISTATAPHTC 7.279* 7.339* 7.256*   [id number] 72 56* 79         Recent Labs     10/13/21  1950 10/14/21  0330 10/15/21  0500   SODIUM 141 143 143   POTASSIUM 4.8 4.7 4.9   CHLORIDE 105 106 105   CO2 20 19* 18*   BUN 66* 72* 98*   CREATININE 5.04* 5.20* 6.66*   MAGNESIUM 2.9* 3.1* 3.2*   PHOSPHORUS  --  8.5* 9.1*   CALCIUM 7.2* 7.1* 7.6*     Recent Labs     10/13/21  1950 10/14/21  0330 10/15/21  0500    ALTSGPT 32 25  --    ASTSGOT 51* 36  --    ALKPHOSPHAT 55 49  --    TBILIRUBIN 0.5 0.4  --    GLUCOSE 210* 146* 147*     Recent Labs     10/13/21  1950 10/14/21  0330 10/15/21  0500   WBC 8.2 8.2 9.3   NEUTSPOLYS 90.50* 87.60*  --    LYMPHOCYTES 4.90* 7.60*  --    MONOCYTES 2.70 2.70  --    EOSINOPHILS 0.10 0.50  --    BASOPHILS 0.10 0.10  --    ASTSGOT 51* 36  --    ALTSGPT 32 25  --    ALKPHOSPHAT 55 49  --    TBILIRUBIN 0.5 0.4  --      Recent Labs     10/13/21  1950 10/14/21  0330 10/15/21  0500   RBC 3.79* 3.06* 3.75*   HEMOGLOBIN 12.0* 9.5* 11.7*   HEMATOCRIT 38.1* 31.1* 38.2*   PLATELETCT 297 360 320       Imaging  X-Ray:  I have personally reviewed the images and compared with prior images.    Assessment/Plan  * Acute respiratory failure due to COVID-19 ([facility])- (present on admission)  Assessment & Plan  Symptom onset in September  Admitted to OSH 10/4 - treated with remdesivir and dexamethasone  Intubated for respiratory failure 10/11  PF ratio consistently in the 60-70 range since intubation on 100%  COVID-19 pneumonia  Dexamethasone - 10 days  Remdesivir - completed course at [facility]  Fluid balance - keep euvolemic    Ventilator dependent respiratory failure  Modify ventilator to optimize oxygenation and ventilation  Proning protocol  D/C chemical paralysis  HOB > 30 when supine  Chlorhexidine  Perform spontaneous breathing trial when able  Early mobility    Agitation requiring sedation protocol- (present on admission)  Assessment & Plan  Titrate fentanyl for pain < 2  Titrate propofol for RASS -3 to 0  Daily sedation holiday when able  Follow [facility] PAD guidelines    Counseling regarding end of life decision making  Assessment & Plan  Palliative care consultation    WILNER (acute kidney injury) ([facility])- (present on admission)  Assessment & Plan  Likely due to COVID-19  Avoid nephrotoxins  Monitor electrolytes and replete as needed  Renally dose adjust medications  Strict I/O monitoring  Not a candidate  for short-term or long-term renal replacement therapy    Septic shock ([facility])- (present on admission)  Assessment & Plan  Due to COVID-19  Titrate norepinephrine to maintain MAP > 65    Hypertriglyceridemia- (present on admission)  Assessment & Plan  Triglycerides 229  From propofol  Continue to monitor q72 hr    Type 2 diabetes mellitus without complication, without long-term current use of insulin ([facility])- (present on admission)  Assessment & Plan  Hemoglobin A1c 10.4  Sliding scale insulin    COPD suggested by initial evaluation ([facility])- (present on admission)  Assessment & Plan  Possible history of COPD, not bronchospastic at this time  With hypercarbia will trial bronchodilators  Monitor for the need for scheduled nebulizer treatments    Essential hypertension- (present on admission)  Assessment & Plan  Hold home antihypertensives    Obesity (BMI 35.0-39.9 without comorbidity) ([facility])- (present on admission)  Assessment & Plan  Some history of ZULMA per old records  No recent TSH  Further history from family when available  Monitor for sleep disordered breathing if he survives to the point of extubation     DVT prophylaxis: SQ heparin  PUD prophylaxis: Famotidine  Glycemic control: Sliding scale insulin  Nutrition: Tube feeds  Lines: Needed  Erazo: Need for strict I/O monitoring, remove when able  Mobility: Early mobility as tolerated  Goals of care: DNAR/I okay  Disposition: ICU    I have performed a physical exam and reviewed and updated ROS and Plan today (10/15/2021). In review of yesterday's note (10/14/2021), there are no changes except as documented above.     Discussed patient condition and risk of morbidity and/or mortality with RN, RT, Pharmacy and Charge nurse / hot rounds     The patient remains critically ill.  Critical care time = 35 minutes in directly providing and coordinating critical care and extensive data review.  No time overlap and excludes procedures.

## 2021-10-15 NOTE — CARE PLAN
The patient is Watcher - Medium risk of patient condition declining or worsening    Shift Goals  Clinical Goals: Oygen stability   Patient Goals: KASH  Family Goals: No family present      Problem: Hemodynamics  Goal: Patient's hemodynamics, fluid balance and neurologic status will be stable or improve  Outcome: Progressing  Note: Hemodynamic stable at this time.     Problem: Nutrition  Goal: Enteral nutrition will be maintained or improve  Outcome: Progressing

## 2021-10-16 NOTE — PROGRESS NOTES
Cynthia Courtney's office called to do welfare check on patient's significant other, Mirta Alamo. Bedside RN and palliative RN have been unable to reach [place] for approximately 36 hours. Virginia's voicemail is full and she was known to have Covid. 's office to call SICU charge back after welfare check has been performed.

## 2021-10-16 NOTE — CARE PLAN
The patient is Unstable - High likelihood or risk of patient condition declining or worsening    Shift Goals  Clinical Goals: Oxygen stability;Hemodynamic stability  Patient Goals: KASH  Family Goals: No Family Present    Problem: Mobility  Goal: Patient's capacity to carry out activities will improve  Outcome: Not Progressing     Problem: Mechanical Ventilation  Goal: Safe management of artificial airway and ventilation  Outcome: Progressing

## 2021-10-16 NOTE — PROGRESS NOTES
"Critical Care Progress Note    Date of admission  10/13/2021    Chief Complaint  Severe Covid pneumonia and possible developing renal failure     \"76 y.o. obese male with a past medical history of diabetes and hypertension and unfortunately no Covid vaccine who presented 10/4/2021 to [facility] for worsening Covid symptoms and was admitted to the hospital.  Symptoms began in September.  He was started on Decadron I believe he had some remdesivir as well.  He never received tocilizumab.  He clinically deteriorated and required intubation on 10/11.  It was very unstable and the hospitalist at [facility] worked with their [facility] intensivist to make sure the patient would survive the transfer.  Potassium was high initially and its come down from 5.6 to 4.6.  Urine output is still low acceptable creatinine is trended up from 3.4->3.8 the last 24 hours per records from [facility].  Patient is required norepinephrine infusion since intubation but that dose has been weaning down.  He was on 9 mics yesterday 7 at time of transfer and for now.  He has been sedated with propofol, fentanyl and midazolam.  He has been paralyzed with vecuronium.  They initially thought he was too unstable for proning because he would desat with partial movement and I encouraged him yesterday to prone them anyway and he actually improved overnight and they wean the O2 to 90%.  Supination today he had to go back up to 100% and had a PF ratio in the 60s.  He did an hour on the transport vent and sats remained in the 90s and hemodynamics were acceptable and he received critical care transport and arrived here in our ICU.  He was started on Zyvox and cefepime for possible healthcare associated pneumonia but I have no data yet to support that.  D-dimer by report was borderline elevated its being repeated to see if he needs a noninvasive study.  He did not administer tocilizumab for pharmacy protocol reasons per their hospitalist.  Will " "reassess with our protocol here in the next 12-24 hours.  Arterial line is placed emergently since he still on pressors on max vent support while paralyzed.  He will be proned shortly.  Case reviewed at length earlier in the day with Dr. Londono from [facility].\"    History of Presenting Illness  10/13 - transfer from [facility]  10/14 - D/C vecuronium, continue proning protocol, D/C antibiotics as no signs of bacterial pneumonia  10/15 - increase respiratory rate for hypercarbia, decrease FiO2, palliative care  10/16 - continue to be unable to get in contact with family (social work and palliative care are working on it), FiO2 back up to 70%, continue proning protocol, switch sedation from propofol to dexmedetomidine secondary to hypertriglyceridemia    Review of Systems  Review of Systems   Unable to perform ROS: Intubated        Vital Signs for last 24 hours   Pulse:  [] 105  Resp:  [14-44] 34  BP: (101-164)/(52-71) 164/71  SpO2:  [83 %-92 %] 92 %    Hemodynamic parameters for last 24 hours       Respiratory Information for the last 24 hours  Vent Mode: APVCMV  Rate (breaths/min): 34  Vt Target (mL): 450  PEEP/CPAP: 14  MAP: 17  Control VTE (exp VT): 468    Physical Exam   Physical Exam  Vitals and nursing note reviewed.   Constitutional:       Appearance: He is ill-appearing.      Interventions: He is sedated and intubated.   HENT:      Head: Normocephalic and atraumatic.      Right Ear: External ear normal.      Left Ear: External ear normal.      Nose: Nose normal.      Mouth/Throat:      Mouth: Mucous membranes are dry.      Pharynx: Oropharynx is clear.   Eyes:      Pupils: Pupils are equal, round, and reactive to light.   Cardiovascular:      Rate and Rhythm: Regular rhythm. Tachycardia present.      Pulses: Normal pulses.      Heart sounds: Normal heart sounds.   Pulmonary:      Effort: He is intubated.      Breath sounds: Decreased air movement present.      Comments: Coarse bilateral " breath sounds  Abdominal:      Palpations: Abdomen is soft.   Musculoskeletal:         General: Normal range of motion.      Cervical back: Normal range of motion and neck supple.      Right lower leg: Edema present.      Left lower leg: Edema present.   Skin:     General: Skin is warm and dry.      Capillary Refill: Capillary refill takes less than 2 seconds.   Neurological:      Mental Status: He is unresponsive.         Medications  Current Facility-Administered Medications   Medication Dose Route Frequency Provider Last Rate Last Admin   • calcium carbonate (OS-ROBIN 500) tablet 500 mg  500 mg Oral BID WITH MEALS Praful Payton M.D.       • dexmedetomidine (PRECEDEX) 400 mcg/100mL NS premix infusion  0.1-1.5 mcg/kg/hr Intravenous Continuous Mike Castillo M.D. 14.4 mL/hr at 10/17/21 0526 0.5 mcg/kg/hr at 10/17/21 0526   • Pharmacy Consult: Enteral tube insertion - review meds/change route/product selection  1 Each Other PHARMACY TO DOSE Mike Castillo M.D.       • artificial tears (EYE LUBRICANT) ophth ointment 1 Application  1 Application Both Eyes Q8HRS Hang Saleem M.D.   1 Application at 10/17/21 0533   • fentaNYL (SUBLIMAZE) 50 mcg/mL in 50mL (Continuous Infusion)   Intravenous Continuous Hang Saleem M.D. 100 mL/hr at 10/16/21 2125 2,500 mcg at 10/16/21 2125   • acetaminophen (Tylenol) tablet 650 mg  650 mg Enteral Tube Q6HRS PRN Hang Saleem M.D.       • ondansetron (ZOFRAN) syringe/vial injection 4 mg  4 mg Intravenous Q4HRS PRN Hang Saleem M.D.       • ondansetron (ZOFRAN ODT) dispertab 4 mg  4 mg Enteral Tube Q4HRS PRN Hang Saleem M.D.       • Respiratory Therapy Consult   Nebulization Continuous RT Hang Saleem M.D.       • famotidine (PEPCID) tablet 20 mg  20 mg Enteral Tube DAILY Hang Saleem M.D.   20 mg at 10/17/21 0534    Or   • famotidine (PEPCID) injection 20 mg  20 mg Intravenous DAILY Hang Saleem M.D.   20 mg at 10/14/21 0515   •  senna-docusate (PERICOLACE or SENOKOT S) 8.6-50 MG per tablet 2 Tablet  2 Tablet Enteral Tube BID Hang Saleem M.D.   2 Tablet at 10/17/21 0533    And   • polyethylene glycol/lytes (MIRALAX) PACKET 1 Packet  1 Packet Enteral Tube QDAY PRN Hang Saleem M.D.        And   • magnesium hydroxide (MILK OF MAGNESIA) suspension 30 mL  30 mL Enteral Tube QDAY PRN Hang Saleem M.D.        And   • bisacodyl (DULCOLAX) suppository 10 mg  10 mg Rectal QDAY PRN Hang Saleem M.D.       • lidocaine (XYLOCAINE) 1 % injection 2 mL  2 mL Tracheal Tube Q30 MIN PRN Hang Saleem M.D.       • ipratropium-albuterol (DUONEB) nebulizer solution  3 mL Nebulization Q2HRS PRN (RT) Hang Saleem M.D.       • fentaNYL (SUBLIMAZE) injection 25 mcg  25 mcg Intravenous Q HOUR PRN Hang Saleem M.D.        Or   • fentaNYL (SUBLIMAZE) injection 50 mcg  50 mcg Intravenous Q HOUR PRN Hang Saleem M.D.        Or   • fentaNYL (SUBLIMAZE) injection 100 mcg  100 mcg Intravenous Q HOUR PRN Hang Saleem M.D.       • insulin regular (HumuLIN R,NovoLIN R) injection  2-9 Units Subcutaneous Q6HRS Hang Saleem M.D.   3 Units at 10/17/21 0541    And   • dextrose 50% (D50W) injection 50 mL  50 mL Intravenous Q15 MIN PRN Hang Saleem M.D.       • heparin injection 5,000 Units  5,000 Units Subcutaneous Q8HRS Hang Saleem M.D.   5,000 Units at 10/17/21 0534       Fluids    Intake/Output Summary (Last 24 hours) at 10/17/2021 0618  Last data filed at 10/17/2021 0600  Gross per 24 hour   Intake 1471.21 ml   Output 342 ml   Net 1129.21 ml       Laboratory  Recent Labs     10/14/21  1256 10/15/21  0439 10/16/21  0115   ISTATAPH 7.326* 7.253* 7.235*   ISTATAPCO2 42.2* 51.0* 48.2*   ISTATAPO2 60* 80 71   ISTATATCO2 23 24 22   EIVFZJV4VBD 88* 93 90*   ISTATARTHCO3 22.0 22.5 20.4   ISTATARTBE -4 -5* -7*   ISTATTEMP 97.0 F 98.2 F 36.3 C   ISTATFIO2 70 70 70   ISTATSPEC Arterial Arterial Arterial   ISTATAPHTC  7.339* 7.256* 7.244*   [id number] 56* 79 67         Recent Labs     10/15/21  0500 10/16/21  0437 10/17/21  0327   SODIUM 143 135 132*   POTASSIUM 4.9 5.1 7.0*   CHLORIDE 105 100 95*   CO2 18* 17* 19*   BUN 98* 118* 135*   CREATININE 6.66* 8.79* 10.47*   MAGNESIUM 3.2* 3.2* 3.7*   PHOSPHORUS 9.1* 9.2* 13.9*   CALCIUM 7.6* 8.2* 8.9     Recent Labs     10/15/21  0500 10/16/21  0437 10/17/21  0327   GLUCOSE 147* 166* 225*     Recent Labs     10/15/21  0500 10/16/21  0437 10/17/21  0327   WBC 9.3 12.8* 17.0*     Recent Labs     10/15/21  0500 10/16/21  0437 10/17/21  0327   RBC 3.75* 3.71* 4.00*   HEMOGLOBIN 11.7* 11.7* 12.3*   HEMATOCRIT 38.2* 37.4* 40.6*   PLATELETCT 320 279 326       Imaging  X-Ray:  I have personally reviewed the images and compared with prior images.    Assessment/Plan  * Acute respiratory failure due to COVID-19 (HCC)- (present on admission)  Assessment & Plan  Symptom onset in September  Admitted to OSH 10/4 - treated with remdesivir and dexamethasone  Intubated for respiratory failure 10/11  PF ratio consistently in the 60-70 range since intubation on 100%  COVID-19 pneumonia  Dexamethasone - 10 days  Remdesivir - completed course at [facility]  Fluid balance - keep euvolemic    Ventilator dependent respiratory failure  Modify ventilator to optimize oxygenation and ventilation  Proning protocol  D/C chemical paralysis  HOB > 30 when supine  Chlorhexidine  Perform spontaneous breathing trial when able  Early mobility    Agitation requiring sedation protocol- (present on admission)  Assessment & Plan  Titrate fentanyl for pain < 2  Titrate propofol for RASS -3 to 0 --> switch to dexmedetomidine due to hypertriglyceridemia  Daily sedation holiday when able  Follow [facility] PAD guidelines    Counseling regarding end of life decision making  Assessment & Plan  Palliative care consultation    WILNER (acute kidney injury) (HCC)- (present on admission)  Assessment & Plan  Likely due to COVID-19  Avoid  nephrotoxins  Monitor electrolytes and replete as needed  Renally dose adjust medications  Strict I/O monitoring  Not a candidate for short-term or long-term renal replacement therapy    Septic shock ([facility])- (present on admission)  Assessment & Plan  Due to COVID-19  Titrate norepinephrine to maintain MAP > 65    Hypertriglyceridemia- (present on admission)  Assessment & Plan  Triglycerides 229  From propofol  Continue to monitor q72 hr    Type 2 diabetes mellitus without complication, without long-term current use of insulin ([facility])- (present on admission)  Assessment & Plan  Hemoglobin A1c 10.4  Sliding scale insulin    COPD suggested by initial evaluation ([facility])- (present on admission)  Assessment & Plan  Possible history of COPD, not bronchospastic at this time  With hypercarbia will trial bronchodilators  Monitor for the need for scheduled nebulizer treatments    Essential hypertension- (present on admission)  Assessment & Plan  Hold home antihypertensives    Obesity (BMI 35.0-39.9 without comorbidity) ([facility])- (present on admission)  Assessment & Plan  Some history of ZULMA per old records  No recent TSH  Further history from family when available  Monitor for sleep disordered breathing if he survives to the point of extubation     DVT prophylaxis: SQ heparin  PUD prophylaxis: Famotidine  Glycemic control: Sliding scale insulin  Nutrition: Tube feeds  Lines: Needed  Erazo: Need for strict I/O monitoring, remove when able  Mobility: Early mobility as tolerated  Goals of care: DNAR/I okay  Disposition: ICU    I have performed a physical exam and reviewed and updated ROS and Plan today (10/17/2021). In review of yesterday's note (10/16/2021), there are no changes except as documented above.     Discussed patient condition and risk of morbidity and/or mortality with RN, RT, Pharmacy and Charge nurse / hot rounds     The patient remains critically ill.  Critical care time = 35 minutes in directly providing and coordinating  critical care and extensive data review.  No time overlap and excludes procedures.

## 2021-10-16 NOTE — PROGRESS NOTES
"Deputy Sims with [facility] performed a welfare check on patient's significant other Virginia. Virginia is fine, \"just very tired\".  advised Virginia that the hospital has been trying to get a hold of her. Per , Virginia will call bedside RN.  "

## 2021-10-17 PROBLEM — J80 ARDS (ADULT RESPIRATORY DISTRESS SYNDROME) (HCC): Status: ACTIVE | Noted: 2021-01-01

## 2021-10-17 NOTE — PALLIATIVE CARE
"Palliative Care follow-up  Met with patient's wife Virginia, and her daughter, Brisa at bedside in approprate PPE. Dr. Castillo provided a detailed clinical update and Virginia verbalized understanding. Virginia states that she wouldn't want Estuardo to suffer and agrees to proceed with comfort care/compassionate extubation. She states \"that is what he would want.\" Support provided. Difficult times packet left at bedside for Virginia to review.    Thank you for allowing Palliative Care to support this patient and family. Contact [phone] for additional assistance, change in patient status, or with any questions/concerns.   "

## 2021-10-17 NOTE — CONSULTS
"Reason for PC Consult: Advance Care Planning    Consulted by: Dr. Saleem    Assessment:  General: Estuardo Christian is a 76 year-old male patient with a PMHX of morbid obesity, diabetes, and HTN. He was admitted to [facility] on 10/4 and found to be COVID +. He was treated with remdesivir and decadron. Despite this, he clinically deteriorated requiring intubation for hypoxia on 10/11. Transferred to this facility on 10/13/2021 for consideration of dialysis. Per the medical team,\"patient is not a candidate for short-term or long-term renal replacement therapy\".     Social: Estuardo lives in [place] with his wife, Virginia. They have been together for 12 years and  for three. Estuardo is a  and continues to work full time. He is independent in his ADLs.     Consults: Critical Care    Dyspnea: Yes- Intubated  Last BM:  (Prior to arrival)  Pain: Unable to determine  Depression: Unable to determine  Dementia: Unable to Determine     Spiritual:  Is Church or spirituality important for coping with this illness? Unable to determine  Has a  or spiritual provider visit been requested?   No    Palliative Performance Scale: 10%    Advance Directive: None  DPOA: No  POLST: No    Code Status: DNR- I OK    Outcome:  PC RN called patient's wife, Virginia. Introduced self and role of palliative care. Virginia updated RN on the social and medical history. Assessed Virginia's understanding of current medical status, overall health picture, and options for future care. Virginia informed PC RN that she received a telephone update from Dr. Castillo this morning; she demonstrates a good understanding of the current clinical picture.    Explored patient’s expressed values, beliefs, and preferences in order to identify GOC. PC RN queried if pt has ever discussed healthcare wishes and values or completed an Advance Directive in the past. Estuardo does not have an advance directive but has always informed Virginia that he " "would not want to live on life support.    Discussed the option of comfort care. Discussed what this entails including premedicating for anxiety and air hunger prior to extubation and allowing Estuardo to die as peacefully as possible. Virginia feels that this is what Estuardo would want. She states \"I don't want him suffering.\" Virginia would like to visit with Estuardo if possible. She did have a positive COVID test 11 days ago and still has a slight cough though she is feeling \"much, much better.\" She would like to be able to see Estuardo prior to withdrawal of care if possible. Discussed with bedside RN and SICU supervisor to inquire if this would be a possibility. If unable to accomodate a visit in person, PC will help to facilitate a Zoom call.    Active listening, reflection, reminiscing, validation & normalization, and empathic support utilized throughout this encounter.  All questions answered and contact information provided.     Addendum ([id number]): Discussed case with infection prevention. The patient's wife is cleared to visit the patient. ICU RN, ICU supervisor, and MD notified.  Virginia plans to be at bedside around 1100.    Recommendations: I recommend a hospice consult.    Updated: MD and ICU RN    Plan: Likely to proceed with CC today. Will continue to follow the clinical picture and provide support.    Thank you for allowing Palliative Care to participate in this patient's care. Please feel free to call [phone] with any questions or concerns.  "

## 2021-10-17 NOTE — CARE PLAN
Problem: Knowledge Deficit - Standard  Goal: Patient and family/care givers will demonstrate understanding of plan of care, disease process/condition, diagnostic tests and medications  Outcome: Progressing     Problem: Psychosocial  Goal: Patient's level of anxiety will decrease  Outcome: Progressing  Goal: Patient's ability to verbalize feelings about condition will improve  Outcome: Progressing  Goal: Patient's ability to re-evaluate and adapt role responsibilities will improve  Outcome: Progressing  Goal: Patient and family will demonstrate ability to cope with life altering diagnosis and/or procedure  Outcome: Progressing  Goal: Spiritual and cultural needs incorporated into hospitalization  Outcome: Progressing     Problem: Communication  Goal: The ability to communicate needs accurately and effectively will improve  Outcome: Progressing     Problem: Discharge Barriers/Planning  Goal: Patient's continuum of care needs are met  Outcome: Progressing     Problem: Hemodynamics  Goal: Patient's hemodynamics, fluid balance and neurologic status will be stable or improve  Outcome: Progressing     Problem: Respiratory  Goal: Patient will achieve/maintain optimum respiratory ventilation and gas exchange  Outcome: Progressing     Problem: Chest Tube Management  Goal: Complications related to chest tube will be avoided or minimized  Outcome: Progressing     Problem: Fluid Volume  Goal: Fluid volume balance will be maintained  Outcome: Progressing     Problem: Mechanical Ventilation  Goal: Safe management of artificial airway and ventilation  Outcome: Progressing  Goal: Successful weaning off mechanical ventilator, spontaneously maintains adequate gas exchange  Outcome: Progressing  Goal: Patient will be able to express needs and understand communication  Outcome: Progressing     Problem: Dysphagia  Goal: Dysphagia will improve  Outcome: Progressing     Problem: Risk for Aspiration  Goal: Patient's risk for aspiration  will be absent or decrease  Outcome: Progressing     Problem: Nutrition  Goal: Patient's nutritional and fluid intake will be adequate or improve  Outcome: Progressing  Goal: Enteral nutrition will be maintained or improve  Outcome: Progressing  Goal: Enteral nutrition will be maintained or improve  Outcome: Progressing     Problem: Urinary Elimination  Goal: Establish and maintain regular urinary output  Outcome: Progressing     Problem: Bowel Elimination  Goal: Establish and maintain regular bowel function  Outcome: Progressing     Problem: Gastrointestinal Irritability  Goal: Nausea and vomiting will be absent or improve  Outcome: Progressing  Goal: Diarrhea will be absent or improved  Outcome: Progressing     Problem: Rectal Tube  Goal: Fecal output will be contained and skin will remain free from irritation  Outcome: Progressing     Problem: Mobility  Goal: Patient's capacity to carry out activities will improve  Outcome: Progressing     Problem: Self Care  Goal: Patient will have the ability to perform ADLs independently or with assistance (bathe, groom, dress, toilet and feed)  Outcome: Progressing     Problem: Infection - Standard  Goal: Patient will remain free from infection  Outcome: Progressing     Problem: Wound/ / Incision Healing  Goal: Patient's wound/surgical incision will decrease in size and heals properly  Outcome: Progressing     Problem: Skin Integrity  Goal: Skin integrity is maintained or improved  Outcome: Progressing     Problem: Fall Risk  Goal: Patient will remain free from falls  Outcome: Progressing   The patient is Unstable - High likelihood or risk of patient condition declining or worsening    Shift Goals  Clinical Goals: POC, hemodynamic stability  Patient Goals: unabe to assess  Family Goals: no family present    Progress made toward(s) clinical / shift goals:  plan to discuss POC with family.

## 2021-10-17 NOTE — PROGRESS NOTES
"Critical Care Progress Note    Date of admission  10/13/2021    Chief Complaint  Severe Covid pneumonia and possible developing renal failure     \"76 y.o. obese male with a past medical history of diabetes and hypertension and unfortunately no Covid vaccine who presented 10/4/2021 to [facility] for worsening Covid symptoms and was admitted to the hospital.  Symptoms began in September.  He was started on Decadron I believe he had some remdesivir as well.  He never received tocilizumab.  He clinically deteriorated and required intubation on 10/11.  It was very unstable and the hospitalist at [facility] worked with their [facility] intensivist to make sure the patient would survive the transfer.  Potassium was high initially and its come down from 5.6 to 4.6.  Urine output is still low acceptable creatinine is trended up from 3.4->3.8 the last 24 hours per records from [facility].  Patient is required norepinephrine infusion since intubation but that dose has been weaning down.  He was on 9 mics yesterday 7 at time of transfer and for now.  He has been sedated with propofol, fentanyl and midazolam.  He has been paralyzed with vecuronium.  They initially thought he was too unstable for proning because he would desat with partial movement and I encouraged him yesterday to prone them anyway and he actually improved overnight and they wean the O2 to 90%.  Supination today he had to go back up to 100% and had a PF ratio in the 60s.  He did an hour on the transport vent and sats remained in the 90s and hemodynamics were acceptable and he received critical care transport and arrived here in our ICU.  He was started on Zyvox and cefepime for possible healthcare associated pneumonia but I have no data yet to support that.  D-dimer by report was borderline elevated its being repeated to see if he needs a noninvasive study.  He did not administer tocilizumab for pharmacy protocol reasons per their hospitalist.  Will " "reassess with our protocol here in the next 12-24 hours.  Arterial line is placed emergently since he still on pressors on max vent support while paralyzed.  He will be proned shortly.  Case reviewed at length earlier in the day with Dr. Londono from [facility].\"    History of Presenting Illness  10/13 - transfer from [facility]  10/14 - D/C vecuronium, continue proning protocol, D/C antibiotics as no signs of bacterial pneumonia  10/15 - increase respiratory rate for hypercarbia, decrease FiO2, palliative care  10/16 - continue to be unable to get in contact with family (social work and palliative care are working on it), FiO2 back up to 70%, continue proning protocol, switch sedation from propofol to dexmedetomidine secondary to hypertriglyceridemia  10/17 - worsening renal failure w/ hyperkalemia, increasing O2 requirement, transition to comfort measures with compassionate extubation    Review of Systems  Review of Systems   Unable to perform ROS: Intubated        Vital Signs for last 24 hours   Pulse:  [] 66  Resp:  [22-41] 32  BP: (108-164)/(56-71) 164/71  SpO2:  [83 %-93 %] 86 %    Hemodynamic parameters for last 24 hours       Respiratory Information for the last 24 hours  Vent Mode: APVCMV  Rate (breaths/min): 34  Vt Target (mL): 450  PEEP/CPAP: 14  MAP: 19  Control VTE (exp VT): 464    Physical Exam   Physical Exam  Vitals and nursing note reviewed.   Constitutional:       Appearance: He is ill-appearing.      Interventions: He is sedated and intubated.   HENT:      Head: Normocephalic and atraumatic.      Right Ear: External ear normal.      Left Ear: External ear normal.      Nose: Nose normal.      Mouth/Throat:      Mouth: Mucous membranes are dry.      Pharynx: Oropharynx is clear.   Eyes:      Pupils: Pupils are equal, round, and reactive to light.   Cardiovascular:      Rate and Rhythm: Regular rhythm. Tachycardia present.      Pulses: Normal pulses.      Heart sounds: Normal heart " sounds.   Pulmonary:      Effort: He is intubated.      Breath sounds: Decreased air movement present.      Comments: Coarse bilateral breath sounds  Abdominal:      Palpations: Abdomen is soft.   Musculoskeletal:         General: Normal range of motion.      Cervical back: Normal range of motion and neck supple.      Right lower leg: Edema present.      Left lower leg: Edema present.   Skin:     General: Skin is warm and dry.      Capillary Refill: Capillary refill takes less than 2 seconds.   Neurological:      Mental Status: He is unresponsive.         Medications  Current Facility-Administered Medications   Medication Dose Route Frequency Provider Last Rate Last Admin   • MD ALERT...adult comfort care   Other PRN Mike Castillo M.D.       • LORazepam (ATIVAN) injection 5 mg  5 mg Intravenous Q10 MIN PRN Mike Castillo M.D.   4 mg at 10/17/21 1503   • fentaNYL (SUBLIMAZE) 50 mcg/mL in 50mL (Continuous Infusion)   Intravenous Continuous Mike Castillo M.D.       • fentaNYL (SUBLIMAZE) injection 25 mcg  25 mcg Intravenous Q HOUR PRN Hang Saleem M.D.        Or   • fentaNYL (SUBLIMAZE) injection 50 mcg  50 mcg Intravenous Q HOUR PRN Hang Saleem M.D.        Or   • fentaNYL (SUBLIMAZE) injection 100 mcg  100 mcg Intravenous Q HOUR PRN Hang Saleem M.D.           Fluids    Intake/Output Summary (Last 24 hours) at 10/17/2021 1530  Last data filed at 10/17/2021 1400  Gross per 24 hour   Intake 1602.1 ml   Output 378 ml   Net 1224.1 ml       Laboratory  Recent Labs     10/15/21  0439 10/16/21  0115   ISTATAPH 7.253* 7.235*   ISTATAPCO2 51.0* 48.2*   ISTATAPO2 80 71   ISTATATCO2 24 22   ECMEVVO1LMN 93 90*   ISTATARTHCO3 22.5 20.4   ISTATARTBE -5* -7*   ISTATTEMP 98.2 F 36.3 C   ISTATFIO2 70 70   ISTATSPEC Arterial Arterial   ISTATAPHTC 7.256* 7.244*   QETSZALB1MG 79 67         Recent Labs     10/15/21  0500 10/16/21  0437 10/17/21  0327   SODIUM 143 135 132*   POTASSIUM 4.9 5.1 7.0*    CHLORIDE 105 100 95*   CO2 18* 17* 19*   BUN 98* 118* 135*   CREATININE 6.66* 8.79* 10.47*   MAGNESIUM 3.2* 3.2* 3.7*   PHOSPHORUS 9.1* 9.2* 13.9*   CALCIUM 7.6* 8.2* 8.9     Recent Labs     10/15/21  0500 10/16/21  0437 10/17/21  0327   GLUCOSE 147* 166* 225*     Recent Labs     10/15/21  0500 10/16/21  0437 10/17/21  0327   WBC 9.3 12.8* 17.0*     Recent Labs     10/15/21  0500 10/16/21  0437 10/17/21  0327   RBC 3.75* 3.71* 4.00*   HEMOGLOBIN 11.7* 11.7* 12.3*   HEMATOCRIT 38.2* 37.4* 40.6*   PLATELETCT 320 279 326       Imaging  X-Ray:  I have personally reviewed the images and compared with prior images.    Assessment/Plan  * Acute respiratory failure due to COVID-19 (HCC)- (present on admission)  Assessment & Plan  Symptom onset in September  Admitted to OSH 10/4 - treated with remdesivir and dexamethasone  Intubated for respiratory failure 10/11  PF ratio consistently in the 60-70 range since intubation on 100%  COVID-19 pneumonia  Dexamethasone - 10 days  Remdesivir - completed course at [facility]  Fluid balance - keep euvolemic    Ventilator dependent respiratory failure  Modify ventilator to optimize oxygenation and ventilation  Proning protocol  D/C chemical paralysis  HOB > 30 when supine  Chlorhexidine  Perform spontaneous breathing trial when able  Early mobility    Agitation requiring sedation protocol- (present on admission)  Assessment & Plan  Titrate fentanyl for pain < 2  Titrate dexmedetomidine for RASS -3 to 0  Daily sedation holiday when able  Follow [facility] PAD guidelines    Counseling regarding end of life decision making  Assessment & Plan  Palliative care consultation  10/17 - I was finally able to get in touch with the patient's wife, Virginia at [phone], and informed her of Estuardo's declining condition.  I informed her that there were no additional meaningful treatments we could offer Estuardo given his multisystem organ failure with increasing oxygen requirement and worsening  renal function.  Virginia currently has Covid (diagnosed 10/6/2021) so we will arrange a video conference so she can see Estuardo.  We were able to get Virginia cleared so she can come to the bedside to see Estuardo.  We had an increasing discussion about his continually worsening condition and made the decision to transition to comfort measures with a compassionate extubation.  Appropriate orders placed and counseling provided.    WILNER (acute kidney injury) (formerly [facility])- (present on admission)  Assessment & Plan  Likely due to COVID-19  Avoid nephrotoxins  Monitor electrolytes and replete as needed  Renally dose adjust medications  Strict I/O monitoring  Not a candidate for short-term or long-term renal replacement therapy    Septic shock (formerly [facility])- (present on admission)  Assessment & Plan  Due to COVID-19  Titrate norepinephrine to maintain MAP > 65    Hypertriglyceridemia- (present on admission)  Assessment & Plan  Triglycerides 229  From propofol  Continue to monitor q72 hr    Type 2 diabetes mellitus without complication, without long-term current use of insulin (formerly [facility])- (present on admission)  Assessment & Plan  Hemoglobin A1c 10.4  Sliding scale insulin    COPD suggested by initial evaluation (formerly [facility])- (present on admission)  Assessment & Plan  Possible history of COPD, not bronchospastic at this time  With hypercarbia will trial bronchodilators  Monitor for the need for scheduled nebulizer treatments    Essential hypertension- (present on admission)  Assessment & Plan  Hold home antihypertensives    Obesity (BMI 35.0-39.9 without comorbidity) (formerly [facility])- (present on admission)  Assessment & Plan  Some history of ZULMA per old records  No recent TSH  Further history from family when available  Monitor for sleep disordered breathing if he survives to the point of extubation     DVT prophylaxis: SQ heparin  PUD prophylaxis: Famotidine  Glycemic control: Sliding scale insulin  Nutrition: Tube feeds  Lines: Needed  Erazo: Need for strict I/O  monitoring, remove when able  Mobility: Early mobility as tolerated  Goals of care: DNAR/I okay --> comfort care  Disposition: ICU    I have performed a physical exam and reviewed and updated ROS and Plan today (10/17/2021). In review of yesterday's note (10/16/2021), there are no changes except as documented above.     Discussed patient condition and risk of morbidity and/or mortality with Family, RN, RT, Pharmacy, Code status disscussed, Charge nurse / hot rounds and palliative care     The patient remains critically ill.  Critical care time = 90 minutes in directly providing and coordinating critical care and extensive data review.  No time overlap and excludes procedures.

## 2021-10-17 NOTE — PLAN OF CARE (IOPOC)
"Notified this morning of:  K - 7  Cr 10.4  Phos 13.9    From primary teams note: \"Not a candidate for short-term or long-term renal replacement therapy\"    For now I will treat medically with:  - Insulin / D50  - Kayexalate  - Phos binder (CaCO3), serum Ca level is normal    Praful Payton M.D.     "

## 2021-10-17 NOTE — PROGRESS NOTES
Pt wife, Mirta Alamo, given pt belongings prior to leaving, including cell phone, , slippers/shoes, clothing (pants, sweatshirt, belt, keys, pocket knife, face mask) and wallet with $450 cash, ID and assorted cards.    Pt dentures placed in mouth.    Difficult times brochure given to pt wife/family.

## 2021-10-17 NOTE — DISCHARGE SUMMARY
"Death Summary    Cause of Death  Multisystem organ failure due to ARDS due to acute hypoxic respiratory failure due to COVID-19 pneumonia.    Comorbid Conditions at the Time of Death  Principal Problem:    Acute respiratory failure due to COVID-19 ([facility]) POA: Yes  Active Problems:    Obesity (BMI 35.0-39.9 without comorbidity) ([facility]) POA: Yes    Essential hypertension POA: Yes    COPD suggested by initial evaluation ([facility]) POA: Yes    Type 2 diabetes mellitus without complication, without long-term current use of insulin ([facility]) POA: Yes    Hypertriglyceridemia POA: Yes    Septic shock ([facility]) POA: Yes    WILNER (acute kidney injury) ([facility]) POA: Yes    Counseling regarding end of life decision making POA: Unknown    Agitation requiring sedation protocol POA: Yes    ARDS (adult respiratory distress syndrome) ([facility]) POA: Yes  Resolved Problems:    * No resolved hospital problems. *      History of Presenting Illness and Hospital Course  Severe Covid pneumonia and possible developing renal failure     \"76 y.o. obese male with a past medical history of diabetes and hypertension and unfortunately no Covid vaccine who presented 10/4/2021 to [facility] for worsening Covid symptoms and was admitted to the hospital.  Symptoms began in September.  He was started on Decadron I believe he had some remdesivir as well.  He never received tocilizumab.  He clinically deteriorated and required intubation on 10/11.  It was very unstable and the hospitalist at [facility] worked with their [facility] intensivist to make sure the patient would survive the transfer.  Potassium was high initially and its come down from 5.6 to 4.6.  Urine output is still low acceptable creatinine is trended up from 3.4->3.8 the last 24 hours per records from [facility].  Patient is required norepinephrine infusion since intubation but that dose has been weaning down.  He was on 9 mics yesterday 7 at time of transfer and for now.  He has been sedated with " "propofol, fentanyl and midazolam.  He has been paralyzed with vecuronium.  They initially thought he was too unstable for proning because he would desat with partial movement and I encouraged him yesterday to prone them anyway and he actually improved overnight and they wean the O2 to 90%.  Supination today he had to go back up to 100% and had a PF ratio in the 60s.  He did an hour on the transport vent and sats remained in the 90s and hemodynamics were acceptable and he received critical care transport and arrived here in our ICU.  He was started on Zyvox and cefepime for possible healthcare associated pneumonia but I have no data yet to support that.  D-dimer by report was borderline elevated its being repeated to see if he needs a noninvasive study.  He did not administer tocilizumab for pharmacy protocol reasons per their hospitalist.  Will reassess with our protocol here in the next 12-24 hours.  Arterial line is placed emergently since he still on pressors on max vent support while paralyzed.  He will be proned shortly.  Case reviewed at length earlier in the day with Dr. Londono from [facility].\"    10/13 - transfer from [facility]  10/14 - D/C vecuronium, continue proning protocol, D/C antibiotics as no signs of bacterial pneumonia  10/15 - increase respiratory rate for hypercarbia, decrease FiO2, palliative care  10/16 - continue to be unable to get in contact with family (social work and palliative care are working on it), FiO2 back up to 70%, continue proning protocol, switch sedation from propofol to dexmedetomidine secondary to hypertriglyceridemia  10/17 - worsening renal failure w/ hyperkalemia, increasing O2 requirement, transition to comfort measures with compassionate extubation    Time of death: 1515  Date of death: 10/17/2021                 "

## 2021-10-17 NOTE — PROGRESS NOTES
Pt extubated per MD order for comfort care.  Pt wife, and family at bedside.    TOD 1515, pronounced by 2 RNs per order.

## 2021-10-29 NOTE — DOCUMENTATION QUERY
[facility]                                                                       Query Response Note      PATIENT:               LEW TREVINO  ACCT #:                  [id number]  MRN:                     [id number]  :                      1945  ADMIT DATE:       10/13/2021 5:23 PM  DISCH DATE:        10/17/2021 3:15 PM  RESPONDING  PROVIDER #:        [id number]           QUERY TEXT:     It is unclear whether septic shock was present on admission.  Your help is needed.  Please clarify the POA status.     NOTE:  If an appropriate response is not listed below, please respond with a new note.        The patient's Clinical Indicators include:  Clinical indicators  COVID pneumonia  Pulse:  [59-69] 69  Resp:  [24-39] 24  BP: (114-139)/(55-98) 122/59  WBC 8.2, procal 2.57  Septic shock is documented beginning 10/14 as present on admission, but the diagnosis is not reflected on the H&P.       Treatment and monitoring  IV fluids, mechanical ventilation  transferred on norepinephrine    Risk factors  DM, HTN, obesity, ARDS    Thank  you,  Gisell Sheppard,   [email or web address]  Options provided:   -- Septic shock was present at the time of inpatient admission   -- Septic shock was not present at the time of inpatient admission   -- The provider is unable to clinically determine whether septic shock was present on admission or not      Query created by: Regi Sheppard on 10/27/2021 5:25 PM    RESPONSE TEXT:    The provider is unable to clinically determine whether septic shock was present on admission or not          Electronically signed by:  JERICA PANTOJA MD 10/28/2021 8:52 PM

## 2024-05-17 NOTE — HOSPITAL COURSE
"Chief Complaint  Severe Covid pneumonia and possible developing renal failure     \"76 y.o. obese male with a past medical history of diabetes and hypertension and unfortunately no Covid vaccine who presented 10/4/2021 to [facility] for worsening Covid symptoms and was admitted to the hospital.  Symptoms began in September.  He was started on Decadron I believe he had some remdesivir as well.  He never received tocilizumab.  He clinically deteriorated and required intubation on 10/11.  It was very unstable and the hospitalist at [facility] worked with their [facility] intensivist to make sure the patient would survive the transfer.  Potassium was high initially and its come down from 5.6 to 4.6.  Urine output is still low acceptable creatinine is trended up from 3.4->3.8 the last 24 hours per records from [facility].  Patient is required norepinephrine infusion since intubation but that dose has been weaning down.  He was on 9 mics yesterday 7 at time of transfer and for now.  He has been sedated with propofol, fentanyl and midazolam.  He has been paralyzed with vecuronium.  They initially thought he was too unstable for proning because he would desat with partial movement and I encouraged him yesterday to prone them anyway and he actually improved overnight and they wean the O2 to 90%.  Supination today he had to go back up to 100% and had a PF ratio in the 60s.  He did an hour on the transport vent and sats remained in the 90s and hemodynamics were acceptable and he received critical care transport and arrived here in our ICU.  He was started on Zyvox and cefepime for possible healthcare associated pneumonia but I have no data yet to support that.  D-dimer by report was borderline elevated its being repeated to see if he needs a noninvasive study.  He did not administer tocilizumab for pharmacy protocol reasons per their hospitalist.  Will reassess with our protocol here in the next 12-24 hours.  " "Arterial line is placed emergently since he still on pressors on max vent support while paralyzed.  He will be proned shortly.  Case reviewed at length earlier in the day with Dr. Londono from [facility].\"    History of Presenting Illness  10/13 - transfer from [facility]  10/14 - D/C vecuronium, continue proning protocol, D/C antibiotics as no signs of bacterial pneumonia  10/15 - increase respiratory rate for hypercarbia, decrease FiO2, palliative care  10/16 - continue to be unable to get in contact with family (social work and palliative care are working on it), FiO2 back up to 70%, continue proning protocol, switch sedation from propofol to dexmedetomidine secondary to hypertriglyceridemia  10/17 - worsening renal failure w/ hyperkalemia, increasing O2 requirement, transition to comfort measures with compassionate extubation  " Patent